# Patient Record
Sex: FEMALE | Race: WHITE | NOT HISPANIC OR LATINO
[De-identification: names, ages, dates, MRNs, and addresses within clinical notes are randomized per-mention and may not be internally consistent; named-entity substitution may affect disease eponyms.]

---

## 2017-06-20 ENCOUNTER — APPOINTMENT (OUTPATIENT)
Dept: INTERNAL MEDICINE | Facility: CLINIC | Age: 22
End: 2017-06-20

## 2017-06-20 VITALS — BODY MASS INDEX: 24.1 KG/M2 | WEIGHT: 136 LBS | HEIGHT: 63 IN

## 2017-06-20 VITALS — SYSTOLIC BLOOD PRESSURE: 110 MMHG | DIASTOLIC BLOOD PRESSURE: 70 MMHG

## 2017-06-20 DIAGNOSIS — D64.9 ANEMIA, UNSPECIFIED: ICD-10-CM

## 2017-06-20 DIAGNOSIS — R53.83 OTHER FATIGUE: ICD-10-CM

## 2017-06-20 LAB
BILIRUB UR QL STRIP: NORMAL
CLARITY UR: CLEAR
COLLECTION METHOD: NORMAL
GLUCOSE UR-MCNC: NORMAL
HCG UR QL: 0.2 EU/DL
HGB UR QL STRIP.AUTO: NORMAL
KETONES UR-MCNC: NORMAL
LEUKOCYTE ESTERASE UR QL STRIP: NORMAL
NITRITE UR QL STRIP: NORMAL
PH UR STRIP: 5.5
PROT UR STRIP-MCNC: NORMAL
SP GR UR STRIP: 1

## 2017-06-20 RX ORDER — FLUTICASONE PROPIONATE 0.5 MG/G
0.05 CREAM TOPICAL
Qty: 30 | Refills: 0 | Status: DISCONTINUED | COMMUNITY
Start: 2017-04-13

## 2017-06-20 RX ORDER — LURASIDONE HYDROCHLORIDE 20 MG/1
20 TABLET, FILM COATED ORAL
Qty: 30 | Refills: 0 | Status: DISCONTINUED | COMMUNITY
Start: 2016-12-20

## 2017-06-20 RX ORDER — ONDANSETRON 8 MG/1
8 TABLET, ORALLY DISINTEGRATING ORAL
Qty: 15 | Refills: 0 | Status: DISCONTINUED | COMMUNITY
Start: 2017-06-17

## 2017-06-20 RX ORDER — DIVALPROEX SODIUM 500 MG/1
500 TABLET, DELAYED RELEASE ORAL
Qty: 60 | Refills: 0 | Status: DISCONTINUED | COMMUNITY
Start: 2016-12-20

## 2017-06-20 RX ORDER — QUETIAPINE FUMARATE 25 MG/1
25 TABLET ORAL
Qty: 30 | Refills: 0 | Status: DISCONTINUED | COMMUNITY
Start: 2016-12-20

## 2017-06-21 LAB
25(OH)D3 SERPL-MCNC: 32.1 NG/ML
ALBUMIN SERPL ELPH-MCNC: 4.6 G/DL
ALP BLD-CCNC: 39 U/L
ALT SERPL-CCNC: 14 U/L
AMYLASE/CREAT SERPL: 66 U/L
ANION GAP SERPL CALC-SCNC: 13 MMOL/L
AST SERPL-CCNC: 26 U/L
BASOPHILS # BLD AUTO: 0.01 K/UL
BASOPHILS NFR BLD AUTO: 0.2 %
BILIRUB SERPL-MCNC: 0.3 MG/DL
BUN SERPL-MCNC: 17 MG/DL
CALCIUM SERPL-MCNC: 9.4 MG/DL
CHLORIDE SERPL-SCNC: 102 MMOL/L
CHOLEST SERPL-MCNC: 147 MG/DL
CHOLEST/HDLC SERPL: 2.9 RATIO
CO2 SERPL-SCNC: 23 MMOL/L
CREAT SERPL-MCNC: 0.94 MG/DL
EOSINOPHIL # BLD AUTO: 0.04 K/UL
EOSINOPHIL NFR BLD AUTO: 0.7 %
FERRITIN SERPL-MCNC: 44 NG/ML
GLUCOSE SERPL-MCNC: 94 MG/DL
HBA1C MFR BLD HPLC: 4.8 %
HCT VFR BLD CALC: 40.6 %
HDLC SERPL-MCNC: 51 MG/DL
HGB BLD-MCNC: 13.2 G/DL
IMM GRANULOCYTES NFR BLD AUTO: 0 %
LDLC SERPL CALC-MCNC: 64 MG/DL
LPL SERPL-CCNC: 24 U/L
LYMPHOCYTES # BLD AUTO: 1.89 K/UL
LYMPHOCYTES NFR BLD AUTO: 32 %
MAN DIFF?: NORMAL
MCHC RBC-ENTMCNC: 31.1 PG
MCHC RBC-ENTMCNC: 32.5 GM/DL
MCV RBC AUTO: 95.5 FL
MONOCYTES # BLD AUTO: 0.26 K/UL
MONOCYTES NFR BLD AUTO: 4.4 %
NEUTROPHILS # BLD AUTO: 3.71 K/UL
NEUTROPHILS NFR BLD AUTO: 62.7 %
PLATELET # BLD AUTO: 209 K/UL
POTASSIUM SERPL-SCNC: 4.7 MMOL/L
PROT SERPL-MCNC: 7.7 G/DL
RBC # BLD: 4.25 M/UL
RBC # FLD: 12.9 %
SAVE SPECIMEN: NORMAL
SODIUM SERPL-SCNC: 138 MMOL/L
T4 FREE SERPL-MCNC: 1.1 NG/DL
TRIGL SERPL-MCNC: 158 MG/DL
TSH SERPL-ACNC: 2.33 UIU/ML
URATE SERPL-MCNC: 4.4 MG/DL
WBC # FLD AUTO: 5.91 K/UL

## 2017-06-27 ENCOUNTER — APPOINTMENT (OUTPATIENT)
Dept: ULTRASOUND IMAGING | Facility: CLINIC | Age: 22
End: 2017-06-27

## 2018-02-01 ENCOUNTER — APPOINTMENT (OUTPATIENT)
Dept: INTERNAL MEDICINE | Facility: CLINIC | Age: 23
End: 2018-02-01

## 2018-08-25 ENCOUNTER — EMERGENCY (EMERGENCY)
Facility: HOSPITAL | Age: 23
LOS: 1 days | Discharge: HOME | End: 2018-08-25
Attending: EMERGENCY MEDICINE
Payer: COMMERCIAL

## 2018-08-25 VITALS
OXYGEN SATURATION: 97 % | SYSTOLIC BLOOD PRESSURE: 128 MMHG | RESPIRATION RATE: 16 BRPM | DIASTOLIC BLOOD PRESSURE: 83 MMHG | TEMPERATURE: 99 F | HEART RATE: 72 BPM

## 2018-08-25 VITALS
HEART RATE: 95 BPM | TEMPERATURE: 99 F | OXYGEN SATURATION: 98 % | DIASTOLIC BLOOD PRESSURE: 88 MMHG | SYSTOLIC BLOOD PRESSURE: 142 MMHG | RESPIRATION RATE: 16 BRPM | HEIGHT: 64 IN | WEIGHT: 149.91 LBS

## 2018-08-25 LAB
ALBUMIN SERPL ELPH-MCNC: 5 G/DL — SIGNIFICANT CHANGE UP (ref 3.3–5)
ALP SERPL-CCNC: 43 U/L — SIGNIFICANT CHANGE UP (ref 40–120)
ALT FLD-CCNC: 22 U/L — SIGNIFICANT CHANGE UP (ref 10–45)
ANION GAP SERPL CALC-SCNC: 15 MMOL/L — SIGNIFICANT CHANGE UP (ref 5–17)
APPEARANCE UR: CLEAR — SIGNIFICANT CHANGE UP
AST SERPL-CCNC: 28 U/L — SIGNIFICANT CHANGE UP (ref 10–40)
BACTERIA # UR AUTO: ABNORMAL /HPF
BILIRUB SERPL-MCNC: 0.3 MG/DL — SIGNIFICANT CHANGE UP (ref 0.2–1.2)
BILIRUB UR-MCNC: NEGATIVE — SIGNIFICANT CHANGE UP
BUN SERPL-MCNC: 10 MG/DL — SIGNIFICANT CHANGE UP (ref 7–23)
CALCIUM SERPL-MCNC: 9.5 MG/DL — SIGNIFICANT CHANGE UP (ref 8.4–10.5)
CHLORIDE SERPL-SCNC: 102 MMOL/L — SIGNIFICANT CHANGE UP (ref 96–108)
CO2 SERPL-SCNC: 21 MMOL/L — LOW (ref 22–31)
COLOR SPEC: SIGNIFICANT CHANGE UP
CREAT SERPL-MCNC: 0.79 MG/DL — SIGNIFICANT CHANGE UP (ref 0.5–1.3)
DIFF PNL FLD: ABNORMAL
EPI CELLS # UR: SIGNIFICANT CHANGE UP /HPF
GLUCOSE SERPL-MCNC: 99 MG/DL — SIGNIFICANT CHANGE UP (ref 70–99)
GLUCOSE UR QL: NEGATIVE — SIGNIFICANT CHANGE UP
HCG UR QL: NEGATIVE — SIGNIFICANT CHANGE UP
HCT VFR BLD CALC: 40.2 % — SIGNIFICANT CHANGE UP (ref 34.5–45)
HGB BLD-MCNC: 13.6 G/DL — SIGNIFICANT CHANGE UP (ref 11.5–15.5)
KETONES UR-MCNC: NEGATIVE — SIGNIFICANT CHANGE UP
LEUKOCYTE ESTERASE UR-ACNC: NEGATIVE — SIGNIFICANT CHANGE UP
MCHC RBC-ENTMCNC: 31.7 PG — SIGNIFICANT CHANGE UP (ref 27–34)
MCHC RBC-ENTMCNC: 33.8 GM/DL — SIGNIFICANT CHANGE UP (ref 32–36)
MCV RBC AUTO: 93.5 FL — SIGNIFICANT CHANGE UP (ref 80–100)
NITRITE UR-MCNC: NEGATIVE — SIGNIFICANT CHANGE UP
PH UR: 6.5 — SIGNIFICANT CHANGE UP (ref 5–8)
PLATELET # BLD AUTO: 224 K/UL — SIGNIFICANT CHANGE UP (ref 150–400)
POTASSIUM SERPL-MCNC: 3.8 MMOL/L — SIGNIFICANT CHANGE UP (ref 3.5–5.3)
POTASSIUM SERPL-SCNC: 3.8 MMOL/L — SIGNIFICANT CHANGE UP (ref 3.5–5.3)
PROT SERPL-MCNC: 7.5 G/DL — SIGNIFICANT CHANGE UP (ref 6–8.3)
PROT UR-MCNC: NEGATIVE — SIGNIFICANT CHANGE UP
RBC # BLD: 4.3 M/UL — SIGNIFICANT CHANGE UP (ref 3.8–5.2)
RBC # FLD: 12.1 % — SIGNIFICANT CHANGE UP (ref 10.3–14.5)
RBC CASTS # UR COMP ASSIST: SIGNIFICANT CHANGE UP /HPF (ref 0–2)
SODIUM SERPL-SCNC: 138 MMOL/L — SIGNIFICANT CHANGE UP (ref 135–145)
SP GR SPEC: 1.01 — LOW (ref 1.01–1.02)
UROBILINOGEN FLD QL: NEGATIVE — SIGNIFICANT CHANGE UP
WBC # BLD: 4.8 K/UL — SIGNIFICANT CHANGE UP (ref 3.8–10.5)
WBC # FLD AUTO: 4.8 K/UL — SIGNIFICANT CHANGE UP (ref 3.8–10.5)
WBC UR QL: SIGNIFICANT CHANGE UP /HPF (ref 0–5)

## 2018-08-25 PROCEDURE — 85027 COMPLETE CBC AUTOMATED: CPT

## 2018-08-25 PROCEDURE — 99284 EMERGENCY DEPT VISIT MOD MDM: CPT

## 2018-08-25 PROCEDURE — 74177 CT ABD & PELVIS W/CONTRAST: CPT

## 2018-08-25 PROCEDURE — 96374 THER/PROPH/DIAG INJ IV PUSH: CPT | Mod: XU

## 2018-08-25 PROCEDURE — 99284 EMERGENCY DEPT VISIT MOD MDM: CPT | Mod: 25

## 2018-08-25 PROCEDURE — 81025 URINE PREGNANCY TEST: CPT

## 2018-08-25 PROCEDURE — 80053 COMPREHEN METABOLIC PANEL: CPT

## 2018-08-25 PROCEDURE — 81001 URINALYSIS AUTO W/SCOPE: CPT

## 2018-08-25 PROCEDURE — 87591 N.GONORRHOEAE DNA AMP PROB: CPT

## 2018-08-25 PROCEDURE — 74177 CT ABD & PELVIS W/CONTRAST: CPT | Mod: 26

## 2018-08-25 RX ORDER — SODIUM CHLORIDE 9 MG/ML
1000 INJECTION INTRAMUSCULAR; INTRAVENOUS; SUBCUTANEOUS ONCE
Qty: 0 | Refills: 0 | Status: COMPLETED | OUTPATIENT
Start: 2018-08-25 | End: 2018-08-25

## 2018-08-25 RX ORDER — KETOROLAC TROMETHAMINE 30 MG/ML
30 SYRINGE (ML) INJECTION ONCE
Qty: 0 | Refills: 0 | Status: DISCONTINUED | OUTPATIENT
Start: 2018-08-25 | End: 2018-08-25

## 2018-08-25 RX ADMIN — Medication 30 MILLIGRAM(S): at 21:20

## 2018-08-25 RX ADMIN — SODIUM CHLORIDE 1000 MILLILITER(S): 9 INJECTION INTRAMUSCULAR; INTRAVENOUS; SUBCUTANEOUS at 17:37

## 2018-08-25 NOTE — ED ADULT NURSE NOTE - CHPI ED NUR SYMPTOMS NEG
no nausea/no vomiting/no chills/no abdominal distension/no burning urination/no blood in stool/no diarrhea/no dysuria/no fever

## 2018-08-25 NOTE — ED PROVIDER NOTE - DISPOSITION TYPE
I saw Daniel Given in the Done [facility] in [facility] today for strep pharyngitis,  he was treated with Zithromax. Daniel Given will follow up with you if no better or as needed.  Thank you for the opportunity to care for [facility] DORIAN GUSTAFSON DISCHARGE

## 2018-08-25 NOTE — ED ADULT NURSE REASSESSMENT NOTE - NS ED NURSE REASSESS COMMENT FT1
Patient returned from CT scan; A&Ox3 and denies abdominal pain, nausea and vomiting at this time. VSS, 20g IV in R forearm patent and site WNL. family at the bedside. Waiting for CT results.

## 2018-08-25 NOTE — ED ADULT NURSE NOTE - NSIMPLEMENTINTERV_GEN_ALL_ED
Implemented All Universal Safety Interventions:  [place] to call system. Call bell, personal items and telephone within reach. Instruct patient to call for assistance. Room bathroom lighting operational. Non-slip footwear when patient is off stretcher. Physically safe environment: no spills, clutter or unnecessary equipment. Stretcher in lowest position, wheels locked, appropriate side rails in place.

## 2018-08-25 NOTE — ED ADULT NURSE NOTE - OBJECTIVE STATEMENT
22 y/o female presents to the ED c/o RLQ pain x 4 days. Pt evaluated in urgent care and advised to come to ED for r/o appendicitis. Pt also endorsing hematuria, and some bloody stool with wiping. Denies n/v/d/c, fever, chills, urinary s/s, vaginal bleeding/discharge. Pt A&Ox3, in no respiratory distress, no CP, abdomen soft, tender to palpitation in RLQ, nondistended. PT safety maintained with family at bedside.

## 2018-08-25 NOTE — ED PROCEDURE NOTE - PROCEDURE ADDITIONAL DETAILS
POCUS: Emergency Department Focused Ultrasound performed at patient's bedside.  Educational ultrasound was performed, abdominopelvic CT scan to follow.

## 2018-08-25 NOTE — ED PROVIDER NOTE - MEDICAL DECISION MAKING DETAILS
24 y/o female presenting to ED with RLQ pain, positive rebound and psoas sign. DDx: appendicitis. Will do labs, CT scan and reassess.  ATTG: Dr. Garibay

## 2018-08-25 NOTE — ED PROVIDER NOTE - OBJECTIVE STATEMENT
22 y/o female presenting with RLQ pain since 4 days ago and is experienced nausea. Denies any vomiting. Recently went to an urgent care for similar symptoms and was told to come to the ED for further evaluation as they believed she had appendicitis.

## 2018-08-25 NOTE — ED ADULT NURSE REASSESSMENT NOTE - NS ED NURSE REASSESS COMMENT FT1
Patient requesting pain medications, pain 6/10 in R upper abdomen; Dr Garibay aware and at patients bedside.

## 2018-08-27 LAB
N GONORRHOEA RRNA SPEC QL NAA+PROBE: SIGNIFICANT CHANGE UP
SPECIMEN SOURCE: SIGNIFICANT CHANGE UP

## 2018-08-28 NOTE — ED POST DISCHARGE NOTE - DETAILS
lvm to call back. patients gc negative however it appears Chlamydia was not sent. will call to make patient aware and recommend follow up for retest - Charity Funk PA-C discussed with patient and she states no concern for STD at this time, already follow up with pMD - Charity Funk PA-C

## 2018-11-01 ENCOUNTER — APPOINTMENT (OUTPATIENT)
Dept: INTERNAL MEDICINE | Facility: CLINIC | Age: 23
End: 2018-11-01

## 2018-12-14 ENCOUNTER — LABORATORY RESULT (OUTPATIENT)
Age: 23
End: 2018-12-14

## 2018-12-14 ENCOUNTER — APPOINTMENT (OUTPATIENT)
Dept: INTERNAL MEDICINE | Facility: CLINIC | Age: 23
End: 2018-12-14
Payer: COMMERCIAL

## 2018-12-14 ENCOUNTER — NON-APPOINTMENT (OUTPATIENT)
Age: 23
End: 2018-12-14

## 2018-12-14 VITALS — SYSTOLIC BLOOD PRESSURE: 110 MMHG | DIASTOLIC BLOOD PRESSURE: 70 MMHG

## 2018-12-14 VITALS — WEIGHT: 159 LBS | BODY MASS INDEX: 28.17 KG/M2 | HEIGHT: 63 IN

## 2018-12-14 DIAGNOSIS — R51 HEADACHE: ICD-10-CM

## 2018-12-14 DIAGNOSIS — F41.9 ANXIETY DISORDER, UNSPECIFIED: ICD-10-CM

## 2018-12-14 LAB
BILIRUB UR QL STRIP: NORMAL
CLARITY UR: CLEAR
COLLECTION METHOD: NORMAL
GLUCOSE UR-MCNC: NORMAL
HCG UR QL: 0.2 EU/DL
HGB UR QL STRIP.AUTO: NORMAL
KETONES UR-MCNC: NORMAL
LEUKOCYTE ESTERASE UR QL STRIP: NORMAL
NITRITE UR QL STRIP: NORMAL
PH UR STRIP: 5.5
PROT UR STRIP-MCNC: NORMAL
SP GR UR STRIP: 1.02

## 2018-12-14 PROCEDURE — 81003 URINALYSIS AUTO W/O SCOPE: CPT | Mod: QW

## 2018-12-14 PROCEDURE — 90686 IIV4 VACC NO PRSV 0.5 ML IM: CPT

## 2018-12-14 PROCEDURE — 36415 COLL VENOUS BLD VENIPUNCTURE: CPT

## 2018-12-14 PROCEDURE — 93000 ELECTROCARDIOGRAM COMPLETE: CPT

## 2018-12-14 PROCEDURE — 99395 PREV VISIT EST AGE 18-39: CPT | Mod: 25

## 2018-12-14 PROCEDURE — G0008: CPT

## 2018-12-14 RX ORDER — DOXYCYCLINE 50 MG/1
50 CAPSULE ORAL
Qty: 60 | Refills: 0 | Status: DISCONTINUED | COMMUNITY
Start: 2017-04-13 | End: 2018-12-14

## 2018-12-14 RX ORDER — NORETHINDRONE ACETATE AND ETHINYL ESTRADIOL, ETHINYL ESTRADIOL AND FERROUS FUMARATE 1MG-10(24)
1 MG-10 MCG / KIT ORAL
Qty: 84 | Refills: 0 | Status: DISCONTINUED | COMMUNITY
Start: 2016-09-12 | End: 2018-12-14

## 2018-12-14 RX ORDER — ADAPALENE 1 MG/G
0.1 CREAM TOPICAL
Qty: 45 | Refills: 0 | Status: DISCONTINUED | COMMUNITY
Start: 2016-09-08 | End: 2018-12-14

## 2018-12-14 RX ORDER — QUETIAPINE 150 MG/1
150 TABLET, FILM COATED, EXTENDED RELEASE ORAL
Qty: 30 | Refills: 0 | Status: DISCONTINUED | COMMUNITY
Start: 2017-02-23 | End: 2018-12-14

## 2018-12-14 RX ORDER — GABAPENTIN 400 MG/1
400 CAPSULE ORAL
Qty: 150 | Refills: 0 | Status: DISCONTINUED | COMMUNITY
Start: 2017-05-03 | End: 2018-12-14

## 2018-12-14 RX ORDER — LEVONORGESTREL AND ETHINYL ESTRADIOL 0.1-0.02MG
0.1-2 KIT ORAL
Qty: 84 | Refills: 0 | Status: DISCONTINUED | COMMUNITY
Start: 2017-03-31 | End: 2018-12-14

## 2018-12-14 RX ORDER — DAPSONE 50 MG/G
5 GEL TOPICAL
Qty: 60 | Refills: 0 | Status: DISCONTINUED | COMMUNITY
Start: 2017-01-31 | End: 2018-12-14

## 2018-12-14 NOTE — ASSESSMENT
[FreeTextEntry1] : On physical examination the patient's blood pressure was normal. Her physical examination is positive for being overweight and multiple tattoos. I referred her to neurology and also referred her for sleep apnea study and her thoughts are loose weight

## 2018-12-14 NOTE — HISTORY OF PRESENT ILLNESS
[de-identified] : Visit 23-year-old patient with a history of ADD and anxiety. She is presently being treated with a host of medications by her psychiatrist. She is here today for her annual examination. Her main complaint is snoring at night and recurrent bilateral headaches. She denies any neurological deficit associated with them she is up-to-date with her GYN examination

## 2018-12-14 NOTE — HEALTH RISK ASSESSMENT
[] : No [No falls in past year] : Patient reported no falls in the past year [0] : 2) Feeling down, depressed, or hopeless: Not at all (0) [[id number]] : 0 [Fully functional (bathing, dressing, toileting, transferring, walking, feeding)] : Fully functional (bathing, dressing, toileting, transferring, walking, feeding) [Fully functional (using the telephone, shopping, preparing meals, housekeeping, doing laundry, using] : Fully functional and needs no help or supervision to perform IADLs (using the telephone, shopping, preparing meals, housekeeping, doing laundry, using transportation, managing medications and managing finances) [Patient declined discussion] : Patient declined discussion

## 2018-12-14 NOTE — PHYSICAL EXAM
[Normal Sclera/Conjunctiva] : normal sclera/conjunctiva [EOMI] : extraocular movements intact [Normal Outer Ear/Nose] : the outer ears and nose were normal in appearance [Normal Oropharynx] : the oropharynx was normal [Normal TMs] : both tympanic membranes were normal [No JVD] : no jugular venous distention [Supple] : supple [No Lymphadenopathy] : no lymphadenopathy [No Respiratory Distress] : no respiratory distress  [Clear to Auscultation] : lungs were clear to auscultation bilaterally [No Accessory Muscle Use] : no accessory muscle use [Normal Rate] : normal rate  [Regular Rhythm] : with a regular rhythm [Normal S1, S2] : normal S1 and S2 [Soft] : abdomen soft [Non Tender] : non-tender [Non-distended] : non-distended [No HSM] : no HSM [Normal Bowel Sounds] : normal bowel sounds [No Hernias] : no hernias [No Joint Swelling] : no joint swelling [Grossly Normal Strength/Tone] : grossly normal strength/tone [No Rash] : no rash [No Skin Lesions] : no skin lesions [Normal Gait] : normal gait [Coordination Grossly Intact] : coordination grossly intact [Normal Affect] : the affect was normal [de-identified] : [place]

## 2018-12-16 ENCOUNTER — CLINICAL ADVICE (OUTPATIENT)
Age: 23
End: 2018-12-16

## 2018-12-16 LAB
25(OH)D3 SERPL-MCNC: 19.6 NG/ML
ALBUMIN SERPL ELPH-MCNC: 4.6 G/DL
ALP BLD-CCNC: 41 U/L
ALT SERPL-CCNC: 21 U/L
ANION GAP SERPL CALC-SCNC: 11 MMOL/L
AST SERPL-CCNC: 30 U/L
BASOPHILS # BLD AUTO: 0.01 K/UL
BASOPHILS NFR BLD AUTO: 0.2 %
BILIRUB SERPL-MCNC: 0.2 MG/DL
BUN SERPL-MCNC: 17 MG/DL
CALCIUM SERPL-MCNC: 9.3 MG/DL
CHLORIDE SERPL-SCNC: 105 MMOL/L
CHOLEST SERPL-MCNC: 167 MG/DL
CHOLEST/HDLC SERPL: 3.6 RATIO
CO2 SERPL-SCNC: 22 MMOL/L
CREAT SERPL-MCNC: 0.77 MG/DL
EOSINOPHIL # BLD AUTO: 0.07 K/UL
EOSINOPHIL NFR BLD AUTO: 1.1 %
FERRITIN SERPL-MCNC: 50 NG/ML
GLUCOSE SERPL-MCNC: 109 MG/DL
HBA1C MFR BLD HPLC: 4.8 %
HBV SURFACE AB SER QL: NONREACTIVE
HBV SURFACE AG SER QL: NONREACTIVE
HCT VFR BLD CALC: 37.6 %
HDLC SERPL-MCNC: 46 MG/DL
HGB BLD-MCNC: 12.1 G/DL
IMM GRANULOCYTES NFR BLD AUTO: 0.3 %
LDLC SERPL CALC-MCNC: 60 MG/DL
LYMPHOCYTES # BLD AUTO: 2.07 K/UL
LYMPHOCYTES NFR BLD AUTO: 32.4 %
MAN DIFF?: NORMAL
MCHC RBC-ENTMCNC: 30.6 PG
MCHC RBC-ENTMCNC: 32.2 GM/DL
MCV RBC AUTO: 94.9 FL
MONOCYTES # BLD AUTO: 0.3 K/UL
MONOCYTES NFR BLD AUTO: 4.7 %
NEUTROPHILS # BLD AUTO: 3.92 K/UL
NEUTROPHILS NFR BLD AUTO: 61.3 %
PLATELET # BLD AUTO: 246 K/UL
POTASSIUM SERPL-SCNC: 4.3 MMOL/L
PROT SERPL-MCNC: 6.6 G/DL
RBC # BLD: 3.96 M/UL
RBC # FLD: 13.4 %
SAVE SPECIMEN: NORMAL
SODIUM SERPL-SCNC: 138 MMOL/L
T3RU NFR SERPL: 1.08 INDEX
T4 SERPL-MCNC: 7.9 UG/DL
TRIGL SERPL-MCNC: 303 MG/DL
TSH SERPL-ACNC: 1.07 UIU/ML
URATE SERPL-MCNC: 4.7 MG/DL
VIT B12 SERPL-MCNC: 498 PG/ML
WBC # FLD AUTO: 6.39 K/UL

## 2019-03-04 ENCOUNTER — APPOINTMENT (OUTPATIENT)
Dept: PULMONOLOGY | Facility: CLINIC | Age: 24
End: 2019-03-04
Payer: COMMERCIAL

## 2019-03-04 VITALS
TEMPERATURE: 97.7 F | DIASTOLIC BLOOD PRESSURE: 76 MMHG | HEART RATE: 81 BPM | HEIGHT: 63 IN | WEIGHT: 166 LBS | SYSTOLIC BLOOD PRESSURE: 121 MMHG | OXYGEN SATURATION: 95 % | RESPIRATION RATE: 15 BRPM | BODY MASS INDEX: 29.41 KG/M2

## 2019-03-04 DIAGNOSIS — F51.4 SLEEP TERRORS [NIGHT TERRORS]: ICD-10-CM

## 2019-03-04 DIAGNOSIS — G47.52 REM SLEEP BEHAVIOR DISORDER: ICD-10-CM

## 2019-03-04 DIAGNOSIS — G47.50 PARASOMNIA, UNSPECIFIED: ICD-10-CM

## 2019-03-04 PROCEDURE — 99204 OFFICE O/P NEW MOD 45 MIN: CPT | Mod: GC

## 2019-04-02 ENCOUNTER — OUTPATIENT (OUTPATIENT)
Dept: OUTPATIENT SERVICES | Facility: HOSPITAL | Age: 24
LOS: 1 days | End: 2019-04-02
Payer: COMMERCIAL

## 2019-04-02 ENCOUNTER — APPOINTMENT (OUTPATIENT)
Dept: SLEEP CENTER | Facility: CLINIC | Age: 24
End: 2019-04-02
Payer: COMMERCIAL

## 2019-04-02 PROCEDURE — 95810 POLYSOM 6/> YRS 4/> PARAM: CPT

## 2019-04-02 PROCEDURE — 95810 POLYSOM 6/> YRS 4/> PARAM: CPT | Mod: 26

## 2019-04-03 DIAGNOSIS — G47.33 OBSTRUCTIVE SLEEP APNEA (ADULT) (PEDIATRIC): ICD-10-CM

## 2019-04-29 ENCOUNTER — APPOINTMENT (OUTPATIENT)
Dept: PULMONOLOGY | Facility: CLINIC | Age: 24
End: 2019-04-29
Payer: COMMERCIAL

## 2019-04-29 VITALS
OXYGEN SATURATION: 98 % | DIASTOLIC BLOOD PRESSURE: 81 MMHG | BODY MASS INDEX: 29.07 KG/M2 | TEMPERATURE: 97.7 F | SYSTOLIC BLOOD PRESSURE: 119 MMHG | WEIGHT: 164.13 LBS | HEART RATE: 80 BPM | RESPIRATION RATE: 16 BRPM

## 2019-04-29 DIAGNOSIS — F51.3 SLEEPWALKING [SOMNAMBULISM]: ICD-10-CM

## 2019-04-29 DIAGNOSIS — G47.8 OTHER SLEEP DISORDERS: ICD-10-CM

## 2019-04-29 PROCEDURE — 99215 OFFICE O/P EST HI 40 MIN: CPT | Mod: GC

## 2019-04-29 NOTE — HISTORY OF PRESENT ILLNESS
[FreeTextEntry1] : Ms. Avalos is a 23 year old female with a significant pmhx of bipolar I disorder who comes in for a follow up for insomnia and abnormal sleep behavior.\par \par She had a sleep study on 4/2/19, AHI 0.5/hr. It also showed abrupt spontaneous arousals associated with arm and leg movements during N3 sleep, suggestive of nonREM parasomnias.\par \par She is still endorsing difficulty initiating and maintaining sleep. Reports going to bed around 11pm, stays in bed usually to 11am, up to as late as 2pm. Wakes up frequently throughout the night. Parasomnias are still present. Reports falling down the stairs yesterday night as part of a somnambulism episode. She was not aware until this morning when she felt her wrist hurting in the shower. she also reports nearly nightly episodes of sleep talking and arm and leg movements.  she also reports nocturnal sleep eating-- she repots being unaware that she has eaten during the night and typically finds food in the bedroom with nor recollection as to how it got there.   her mother denies parasomnia like behavior when she was a child and denies a family history of parasomnias. she reports episodes occur nearly on a nightly basis. no reported dream enactment behavior. \par \par she repots that she is at times disturbed during sleep by her dog who sleeps in her bed with her.  she denies n oise or other disturbances in her sleep environment.  she denies caffeine consumption during the day/evening except for one cup of coffee in the morning. \par \par she is on several psychotropic medications for bipolar disorder.  she states that these nocturnal behaviors occurred before she was on these medications. \par \par while a brief  period of lagophthalmus was noted on the video recording of the psg, she denies awakeing with dry eyes or other eye related symptoms.

## 2019-04-29 NOTE — PHYSICAL EXAM
[Normal Appearance] : normal appearance [General Appearance - In No Acute Distress] : no acute distress [Normal Conjunctiva] : the conjunctiva exhibited no abnormalities [I] : I [Heart Rate And Rhythm] : heart rate was normal and rhythm regular [Heart Sounds] : normal S1 and S2 [] : no respiratory distress [Respiration, Rhythm And Depth] : normal respiratory rhythm and effort [Auscultation Breath Sounds / Voice Sounds] : lungs were clear to auscultation bilaterally [Bowel Sounds] : normal bowel sounds [Abdomen Soft] : soft [Involuntary Movements] : no involuntary movements were seen [Cyanosis, Localized] : no localized cyanosis [Skin Color & Pigmentation] : normal skin color and pigmentation [No Focal Deficits] : no focal deficits [Affect] : the affect was normal [Mood] : the mood was normal

## 2019-04-29 NOTE — ASSESSMENT
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 Avalos is a 23 year old female with a significant pmhx of bipolar I disorder who comes in for a follow up for insomnia and unusual sleep behavior. She had a sleep study on 4/2/19 which did not show sleep disordered breathing, but was significant for spontaneous arousals associated with arm and leg movements during N3 sleep, suggestive of nonREM parasomnias. she reports events nearly on a nightly basis and she has injured herself falling down the stairs during a somnambulism event last night. she also reports nocturnal sleep eating behaviors.  these reports as well as the psg findings are consistent with NREM parasomnias.  t is still likely that her bipolar disorder is playing a role in both preventing her to initiate and maintain sleep; at this point, she has sustained an injury from parasomnia behavior.  she was advised to secure  her bedroom environment removing objects that could cause injury.  in addition she was advised to place a simply lock on her bedroom door to prevent her from leaving the bedroom and going to the stairs during another parasomnia event.  in addition, she was advised to eliminate any disturbing factors in her bedroom that could cause arousals from N# sleep. in this regard she sleeps with her pet dog which at times disturbs her sleep.  she was advised to remove the dog from the bed to see if this reduces the frequency of parasomnia events. if these measures re not effective, CBT for insomnia and to reduced sleep related anxiety might be effective in reducing arousals from n3  sleep and thus n3 parasomnias. she was referred to Dr. Heaven Edgar in this regard.  The patient was advised to develop a regular sleep schedule, eliminate evening caffeine, and remove external stimuli that may be disrupting her sleep -  If her parasomnias remain unchanged with behavior modification and CBT, will need to discuss with her psychiatrist about initiating low dose clonazepam before bedtime which can control parasomnias by reducing arousals during N3 sleep. during the study a brief period of lagophthalmus was noted. however, the pt denies eye related symptoms, in particular dry eyes, so no further intervention in this regard is needed.

## 2019-04-29 NOTE — REVIEW OF SYSTEMS
[Fatigue] : fatigue [Recent Wt Gain (___ Lbs)] : recent [unfilled] ~Ulb weight gain [A.M. Headache] : headache present upon awakening [Difficulty Initiating Sleep] : difficulty falling asleep [Difficulty Maintaining Sleep] : difficulty maintaining sleep [Chronic Insomnia] : chronic  insomnia [Unusual Sleep Behavior] : unusual sleep behavior [Witnessed Apneas] : no witnessed apnea [Shortness Of Breath] : no shortness of breath [Chest Pain] : no chest pain [Thyroid Disease] : no thyroid disease [Diabetes] : no diabetes  [Anemia] : no anemia [Cataplexy] :  no cataplexy [Heartburn] : no heartburn [Nocturia] : no nocturia [Arthralgias] : no arthralgias [Lower Extremity Discomfort] : no lower extremity discomfort [Hypersomnolence] : not sleeping much more than usual [de-identified] : h/o bipolar disorder as per hpi [de-identified] : see hpi

## 2019-06-05 ENCOUNTER — APPOINTMENT (OUTPATIENT)
Dept: INTERNAL MEDICINE | Facility: CLINIC | Age: 24
End: 2019-06-05

## 2019-10-08 ENCOUNTER — APPOINTMENT (OUTPATIENT)
Dept: INTERNAL MEDICINE | Facility: CLINIC | Age: 24
End: 2019-10-08
Payer: COMMERCIAL

## 2019-10-08 VITALS
HEIGHT: 63 IN | WEIGHT: 144 LBS | HEART RATE: 94 BPM | SYSTOLIC BLOOD PRESSURE: 132 MMHG | DIASTOLIC BLOOD PRESSURE: 84 MMHG | BODY MASS INDEX: 25.52 KG/M2

## 2019-10-08 VITALS — TEMPERATURE: 98.2 F

## 2019-10-08 PROCEDURE — 99213 OFFICE O/P EST LOW 20 MIN: CPT

## 2019-10-08 RX ORDER — LURASIDONE HYDROCHLORIDE 40 MG/1
40 TABLET, FILM COATED ORAL
Refills: 0 | Status: DISCONTINUED | COMMUNITY
Start: 2017-04-26 | End: 2019-10-08

## 2019-10-08 RX ORDER — SULFACETAMIDE SODIUM, SULFUR 100; 50 MG/G; MG/G
10-5 LIQUID TOPICAL
Qty: 170 | Refills: 0 | Status: DISCONTINUED | COMMUNITY
Start: 2016-10-05 | End: 2019-10-08

## 2019-10-08 RX ORDER — DOXEPIN HYDROCHLORIDE 25 MG/1
25 CAPSULE ORAL DAILY
Refills: 0 | Status: DISCONTINUED | COMMUNITY
Start: 2017-04-14 | End: 2019-10-08

## 2019-10-08 NOTE — PLAN
[FreeTextEntry1] : -Increase fluids, such as pedialyte, gatorade and ginger ale,  rest.\par -Risks and benefits of antibiotics explained in detail, Sometimes the antibiotics that you are taking to kill the disease causing bacteria also kill some of the normal bacteria in your intestinal tract (and/or in your vagina, if female). Buying and taking a "probiotic" may be helpful in replacing some of these normal bacteria.\par -Follow-up as needed if symptoms not improved in  3-5 days, sooner if worsens.\par

## 2019-10-08 NOTE — HISTORY OF PRESENT ILLNESS
[FreeTextEntry8] : 25 y/o female presents c/o sinus pressure/headache for the last 2-3 weeks.  associated with nausea, fatigue, and cough.  works in [employer] .  +recent travel, [place]  earlier this month.  normal po intake.

## 2019-10-08 NOTE — PHYSICAL EXAM
[Supple] : supple [No Lymphadenopathy] : no lymphadenopathy [Normal] : soft, non-tender, non-distended, no masses palpated, no HSM and normal bowel sounds [No Rash] : no rash [Normal Affect] : the affect was normal [FreeTextEntry1] : Ears: TM's normal bilaterally, middle ear effusion appreciated bilaterally\par Nose: nasal mucosa edematous, clear rhinorrhea, moderate airway obstruction\par Nose: nares erythematous, congested\par + tenderness to percussion over frontal sinus\par Pharynx non-erythematous, no exudates\par \par  [Normal Mood] : the mood was normal

## 2019-12-18 ENCOUNTER — APPOINTMENT (OUTPATIENT)
Dept: INTERNAL MEDICINE | Facility: CLINIC | Age: 24
End: 2019-12-18
Payer: COMMERCIAL

## 2019-12-18 VITALS
OXYGEN SATURATION: 97 % | TEMPERATURE: 98.3 F | HEIGHT: 63 IN | HEART RATE: 96 BPM | BODY MASS INDEX: 24.63 KG/M2 | DIASTOLIC BLOOD PRESSURE: 82 MMHG | WEIGHT: 139 LBS | SYSTOLIC BLOOD PRESSURE: 134 MMHG

## 2019-12-18 DIAGNOSIS — R05 COUGH: ICD-10-CM

## 2019-12-18 PROCEDURE — 99213 OFFICE O/P EST LOW 20 MIN: CPT

## 2019-12-18 RX ORDER — AMOXICILLIN 875 MG/1
875 TABLET, FILM COATED ORAL
Qty: 20 | Refills: 0 | Status: DISCONTINUED | COMMUNITY
Start: 2019-10-08 | End: 2019-12-18

## 2019-12-18 RX ORDER — BUPROPION HYDROCHLORIDE 300 MG/1
300 TABLET, EXTENDED RELEASE ORAL
Qty: 30 | Refills: 0 | Status: DISCONTINUED | COMMUNITY
Start: 2017-04-18 | End: 2019-12-18

## 2019-12-18 NOTE — PHYSICAL EXAM
[No Lymphadenopathy] : no lymphadenopathy [Supple] : supple [Normal] : normal rate, regular rhythm, normal S1 and S2 and no murmur heard [No Edema] : there was no peripheral edema [Soft] : abdomen soft [Normal Posterior Cervical Nodes] : no posterior cervical lymphadenopathy [Non-distended] : non-distended [Non Tender] : non-tender [No Rash] : no rash [Normal Anterior Cervical Nodes] : no anterior cervical lymphadenopathy [de-identified] : mild wheezing on left lung field

## 2019-12-18 NOTE — HISTORY OF PRESENT ILLNESS
[FreeTextEntry8] : 25 y/o female presents c/o cough, wheezing for the last 2-3 days.  sister was dxed with pneumonia at home.  associated with nausea, fatigue.  works in [employer] . normal po intake.  has been taking otc meds with no relief

## 2020-04-20 RX ORDER — AZITHROMYCIN 250 MG/1
250 TABLET, FILM COATED ORAL
Qty: 1 | Refills: 0 | Status: DISCONTINUED | COMMUNITY
Start: 2019-12-18 | End: 2020-04-20

## 2020-04-22 ENCOUNTER — RESULT REVIEW (OUTPATIENT)
Age: 25
End: 2020-04-22

## 2020-04-22 ENCOUNTER — OUTPATIENT (OUTPATIENT)
Dept: OUTPATIENT SERVICES | Facility: HOSPITAL | Age: 25
LOS: 1 days | End: 2020-04-22
Payer: COMMERCIAL

## 2020-04-22 ENCOUNTER — LABORATORY RESULT (OUTPATIENT)
Age: 25
End: 2020-04-22

## 2020-04-22 ENCOUNTER — APPOINTMENT (OUTPATIENT)
Dept: ULTRASOUND IMAGING | Facility: CLINIC | Age: 25
End: 2020-04-22
Payer: COMMERCIAL

## 2020-04-22 DIAGNOSIS — N64.3 GALACTORRHEA NOT ASSOCIATED WITH CHILDBIRTH: ICD-10-CM

## 2020-04-22 PROCEDURE — 76641 ULTRASOUND BREAST COMPLETE: CPT

## 2020-04-22 PROCEDURE — 76641 ULTRASOUND BREAST COMPLETE: CPT | Mod: 26,50

## 2020-04-23 DIAGNOSIS — E34.9 ENDOCRINE DISORDER, UNSPECIFIED: ICD-10-CM

## 2020-04-23 LAB
ALBUMIN SERPL ELPH-MCNC: 5.1 G/DL
ALP BLD-CCNC: 35 U/L
ALT SERPL-CCNC: 13 U/L
ANION GAP SERPL CALC-SCNC: 14 MMOL/L
AST SERPL-CCNC: 14 U/L
BASOPHILS # BLD AUTO: 0.03 K/UL
BASOPHILS NFR BLD AUTO: 0.4 %
BILIRUB SERPL-MCNC: 0.5 MG/DL
BUN SERPL-MCNC: 13 MG/DL
CALCIUM SERPL-MCNC: 10.1 MG/DL
CHLORIDE SERPL-SCNC: 100 MMOL/L
CO2 SERPL-SCNC: 23 MMOL/L
CREAT SERPL-MCNC: 0.78 MG/DL
EOSINOPHIL # BLD AUTO: 0.04 K/UL
EOSINOPHIL NFR BLD AUTO: 0.5 %
GLUCOSE SERPL-MCNC: 93 MG/DL
HCT VFR BLD CALC: 40.2 %
HGB BLD-MCNC: 12.7 G/DL
IMM GRANULOCYTES NFR BLD AUTO: 0.4 %
LH SERPL-ACNC: 0.7 IU/L
LYMPHOCYTES # BLD AUTO: 2.03 K/UL
LYMPHOCYTES NFR BLD AUTO: 26.5 %
MAN DIFF?: NORMAL
MCHC RBC-ENTMCNC: 30.9 PG
MCHC RBC-ENTMCNC: 31.6 GM/DL
MCV RBC AUTO: 97.8 FL
MONOCYTES # BLD AUTO: 0.55 K/UL
MONOCYTES NFR BLD AUTO: 7.2 %
NEUTROPHILS # BLD AUTO: 4.98 K/UL
NEUTROPHILS NFR BLD AUTO: 65 %
PLATELET # BLD AUTO: 196 K/UL
POTASSIUM SERPL-SCNC: 4.5 MMOL/L
PROLACTIN SERPL-MCNC: 23.9 NG/ML
PROT SERPL-MCNC: 7 G/DL
RBC # BLD: 4.11 M/UL
RBC # FLD: 13.3 %
SODIUM SERPL-SCNC: 137 MMOL/L
TSH SERPL-ACNC: 1.18 UIU/ML
WBC # FLD AUTO: 7.66 K/UL

## 2020-04-24 LAB — ESTROGEN SERPL-MCNC: 79 PG/ML

## 2020-04-25 ENCOUNTER — RESULT REVIEW (OUTPATIENT)
Age: 25
End: 2020-04-25

## 2020-04-25 ENCOUNTER — OUTPATIENT (OUTPATIENT)
Dept: OUTPATIENT SERVICES | Facility: HOSPITAL | Age: 25
LOS: 1 days | End: 2020-04-25
Payer: COMMERCIAL

## 2020-04-25 ENCOUNTER — APPOINTMENT (OUTPATIENT)
Dept: ULTRASOUND IMAGING | Facility: IMAGING CENTER | Age: 25
End: 2020-04-25
Payer: COMMERCIAL

## 2020-04-25 DIAGNOSIS — E34.9 ENDOCRINE DISORDER, UNSPECIFIED: ICD-10-CM

## 2020-04-25 PROCEDURE — 76830 TRANSVAGINAL US NON-OB: CPT

## 2020-04-25 PROCEDURE — 76830 TRANSVAGINAL US NON-OB: CPT | Mod: 26

## 2020-04-25 PROCEDURE — 76856 US EXAM PELVIC COMPLETE: CPT

## 2020-04-25 PROCEDURE — 76856 US EXAM PELVIC COMPLETE: CPT | Mod: 26,59

## 2020-04-26 LAB — HCG SERPL-MCNC: <1 MIU/ML

## 2020-04-28 ENCOUNTER — TRANSCRIPTION ENCOUNTER (OUTPATIENT)
Age: 25
End: 2020-04-28

## 2020-04-28 LAB — FSH: 2.1 MIU/ML

## 2020-05-01 ENCOUNTER — OUTPATIENT (OUTPATIENT)
Dept: OUTPATIENT SERVICES | Facility: HOSPITAL | Age: 25
LOS: 1 days | End: 2020-05-01
Payer: COMMERCIAL

## 2020-05-01 ENCOUNTER — APPOINTMENT (OUTPATIENT)
Dept: MRI IMAGING | Facility: CLINIC | Age: 25
End: 2020-05-01
Payer: COMMERCIAL

## 2020-05-01 DIAGNOSIS — N64.3 GALACTORRHEA NOT ASSOCIATED WITH CHILDBIRTH: ICD-10-CM

## 2020-05-01 PROCEDURE — 70553 MRI BRAIN STEM W/O & W/DYE: CPT | Mod: 26

## 2020-05-01 PROCEDURE — 70553 MRI BRAIN STEM W/O & W/DYE: CPT

## 2020-05-01 PROCEDURE — A9585: CPT

## 2020-07-28 ENCOUNTER — APPOINTMENT (OUTPATIENT)
Dept: INTERNAL MEDICINE | Facility: CLINIC | Age: 25
End: 2020-07-28
Payer: COMMERCIAL

## 2020-07-28 ENCOUNTER — LABORATORY RESULT (OUTPATIENT)
Age: 25
End: 2020-07-28

## 2020-07-28 ENCOUNTER — NON-APPOINTMENT (OUTPATIENT)
Age: 25
End: 2020-07-28

## 2020-07-28 VITALS
BODY MASS INDEX: 24.1 KG/M2 | TEMPERATURE: 98.2 F | WEIGHT: 136 LBS | HEIGHT: 63 IN | OXYGEN SATURATION: 99 % | HEART RATE: 112 BPM

## 2020-07-28 VITALS — SYSTOLIC BLOOD PRESSURE: 120 MMHG | DIASTOLIC BLOOD PRESSURE: 70 MMHG

## 2020-07-28 DIAGNOSIS — J01.10 ACUTE FRONTAL SINUSITIS, UNSPECIFIED: ICD-10-CM

## 2020-07-28 DIAGNOSIS — R10.9 UNSPECIFIED ABDOMINAL PAIN: ICD-10-CM

## 2020-07-28 DIAGNOSIS — Z00.00 ENCOUNTER FOR GENERAL ADULT MEDICAL EXAMINATION W/OUT ABNORMAL FINDINGS: ICD-10-CM

## 2020-07-28 DIAGNOSIS — F98.8 OTHER SPECIFIED BEHAVIORAL AND EMOTIONAL DISORDERS WITH ONSET USUALLY OCCURRING IN CHILDHOOD AND ADOLESCENCE: ICD-10-CM

## 2020-07-28 DIAGNOSIS — N64.3 GALACTORRHEA NOT ASSOCIATED WITH CHILDBIRTH: ICD-10-CM

## 2020-07-28 DIAGNOSIS — G47.51 CONFUSIONAL AROUSALS: ICD-10-CM

## 2020-07-28 DIAGNOSIS — Z88.9 ALLERGY STATUS TO UNSPECIFIED DRUGS, MEDICAMENTS AND BIOLOGICAL SUBSTANCES: ICD-10-CM

## 2020-07-28 LAB
ALBUMIN SERPL ELPH-MCNC: 5.3 G/DL
ALP BLD-CCNC: 39 U/L
ALT SERPL-CCNC: 13 U/L
ANION GAP SERPL CALC-SCNC: 13 MMOL/L
AST SERPL-CCNC: 20 U/L
BASOPHILS # BLD AUTO: 0.03 K/UL
BASOPHILS NFR BLD AUTO: 0.4 %
BILIRUB SERPL-MCNC: 0.4 MG/DL
BUN SERPL-MCNC: 14 MG/DL
CALCIUM SERPL-MCNC: 10 MG/DL
CHLORIDE SERPL-SCNC: 102 MMOL/L
CHOLEST SERPL-MCNC: 175 MG/DL
CHOLEST/HDLC SERPL: 3.2 RATIO
CO2 SERPL-SCNC: 24 MMOL/L
CREAT SERPL-MCNC: 0.82 MG/DL
EOSINOPHIL # BLD AUTO: 0.03 K/UL
EOSINOPHIL NFR BLD AUTO: 0.4 %
GLUCOSE SERPL-MCNC: 103 MG/DL
HCT VFR BLD CALC: 39.4 %
HDLC SERPL-MCNC: 55 MG/DL
HGB BLD-MCNC: 13 G/DL
IMM GRANULOCYTES NFR BLD AUTO: 0.1 %
LDLC SERPL CALC-MCNC: 78 MG/DL
LYMPHOCYTES # BLD AUTO: 1.69 K/UL
LYMPHOCYTES NFR BLD AUTO: 24.6 %
MAN DIFF?: NORMAL
MCHC RBC-ENTMCNC: 31.5 PG
MCHC RBC-ENTMCNC: 33 GM/DL
MCV RBC AUTO: 95.4 FL
MONOCYTES # BLD AUTO: 0.5 K/UL
MONOCYTES NFR BLD AUTO: 7.3 %
NEUTROPHILS # BLD AUTO: 4.61 K/UL
NEUTROPHILS NFR BLD AUTO: 67.2 %
PLATELET # BLD AUTO: 224 K/UL
POTASSIUM SERPL-SCNC: 5.1 MMOL/L
PROT SERPL-MCNC: 7 G/DL
RBC # BLD: 4.13 M/UL
RBC # FLD: 13.2 %
SODIUM SERPL-SCNC: 140 MMOL/L
TRIGL SERPL-MCNC: 209 MG/DL
URATE SERPL-MCNC: 4.7 MG/DL
WBC # FLD AUTO: 6.87 K/UL

## 2020-07-28 PROCEDURE — 99395 PREV VISIT EST AGE 18-39: CPT | Mod: 25

## 2020-07-28 PROCEDURE — 36415 COLL VENOUS BLD VENIPUNCTURE: CPT

## 2020-07-28 PROCEDURE — 93000 ELECTROCARDIOGRAM COMPLETE: CPT

## 2020-07-28 NOTE — ASSESSMENT
[FreeTextEntry1] : Vital signs were stable. Physical examination was normal. And blood were taken She was also advised to stop smoking

## 2020-07-28 NOTE — HISTORY OF PRESENT ILLNESS
[de-identified] : This is a 25-year-old woman with a history of ADD, bipolar disorder, obstructive sleep apnea, borderline pituitary adenoma who is here today for her annual we'll examination.

## 2020-07-28 NOTE — PHYSICAL EXAM
[Declined Breast Exam] : declined breast exam  [Declined Rectal Exam] : declined rectal exam [Normal] : no posterior cervical lymphadenopathy and no anterior cervical lymphadenopathy

## 2020-07-28 NOTE — HEALTH RISK ASSESSMENT
[] : Yes [0-5] : 0-5 [No] : No [0] : 1) Little interest or pleasure doing things: Not at all (0) [[id number]] : 0

## 2020-07-29 LAB
25(OH)D3 SERPL-MCNC: 24.8 NG/ML
APPEARANCE: CLEAR
BACTERIA: ABNORMAL
BILIRUBIN URINE: NEGATIVE
BLOOD URINE: NEGATIVE
COLOR: YELLOW
ESTIMATED AVERAGE GLUCOSE: 88 MG/DL
FERRITIN SERPL-MCNC: 93 NG/ML
FOLATE SERPL-MCNC: 16.5 NG/ML
GLUCOSE QUALITATIVE U: NEGATIVE
HBA1C MFR BLD HPLC: 4.7 %
HYALINE CASTS: 0 /LPF
KETONES URINE: NORMAL
LEUKOCYTE ESTERASE URINE: NEGATIVE
MICROSCOPIC-UA: NORMAL
NITRITE URINE: NEGATIVE
PH URINE: 6
PROTEIN URINE: NORMAL
RED BLOOD CELLS URINE: 1 /HPF
SPECIFIC GRAVITY URINE: 1.02
SQUAMOUS EPITHELIAL CELLS: 4 /HPF
T3RU NFR SERPL: 1.1 TBI
T4 SERPL-MCNC: 8.6 UG/DL
TSH SERPL-ACNC: 1.2 UIU/ML
UROBILINOGEN URINE: NORMAL
VIT B12 SERPL-MCNC: 363 PG/ML
WHITE BLOOD CELLS URINE: 7 /HPF

## 2020-08-11 ENCOUNTER — APPOINTMENT (OUTPATIENT)
Dept: ENDOCRINOLOGY | Facility: CLINIC | Age: 25
End: 2020-08-11

## 2020-10-22 ENCOUNTER — APPOINTMENT (OUTPATIENT)
Dept: OTOLARYNGOLOGY | Facility: CLINIC | Age: 25
End: 2020-10-22
Payer: COMMERCIAL

## 2020-10-22 VITALS
WEIGHT: 136 LBS | SYSTOLIC BLOOD PRESSURE: 115 MMHG | HEIGHT: 63 IN | BODY MASS INDEX: 24.1 KG/M2 | TEMPERATURE: 97.9 F | DIASTOLIC BLOOD PRESSURE: 70 MMHG | HEART RATE: 69 BPM

## 2020-10-22 DIAGNOSIS — J30.2 OTHER SEASONAL ALLERGIC RHINITIS: ICD-10-CM

## 2020-10-22 PROCEDURE — 99204 OFFICE O/P NEW MOD 45 MIN: CPT | Mod: 25

## 2020-10-22 PROCEDURE — 99072 ADDL SUPL MATRL&STAF TM PHE: CPT

## 2020-10-22 PROCEDURE — 31231 NASAL ENDOSCOPY DX: CPT

## 2020-10-22 NOTE — ASSESSMENT
[FreeTextEntry1] : Patient with a history of difficulty breathing is here for evaluation has no allergies but has difficulty breathing that seems to shift at times.  On examination endoscopically she does have a deviated septum both nasal cavities she is using Flonase we have added Astelin to her regimen to see if that will help.  She is also interested in possible aesthetic refinement of her nose specifically hump reduction as well as straightening out her nasal dorsum.  We discussed pros and cons and the out-of-pocket expense since is not billed to insurance photos were taken she will think about it and get back to us in the near future.
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
## 2020-10-22 NOTE — PHYSICAL EXAM
[Nasal Endoscopy Performed] : nasal endoscopy was performed, see procedure section for findings [] : septum deviated to the left [Midline] : trachea located in midline position [Normal] : no rashes [de-identified] : dorsal nasal deflection to the left

## 2020-10-22 NOTE — CONSULT LETTER
[Dear  ___] : Dear  [unfilled], [Consult Letter:] : I had the pleasure of evaluating your patient, [unfilled]. [Please see my note below.] : Please see my note below. [Consult Closing:] : Thank you very much for allowing me to participate in the care of this patient.  If you have any questions, please do not hesitate to contact me. [Sincerely,] : Sincerely, [FreeTextEntry3] : Erick Danielle MD\par [facility]\par Otolaryngology and Facial Plastics\par [employer], [employer]\par

## 2020-10-29 ENCOUNTER — APPOINTMENT (OUTPATIENT)
Dept: OTOLARYNGOLOGY | Facility: CLINIC | Age: 25
End: 2020-10-29
Payer: COMMERCIAL

## 2020-10-29 VITALS
HEART RATE: 101 BPM | BODY MASS INDEX: 24.1 KG/M2 | WEIGHT: 136 LBS | TEMPERATURE: 97.6 F | DIASTOLIC BLOOD PRESSURE: 85 MMHG | SYSTOLIC BLOOD PRESSURE: 141 MMHG | HEIGHT: 63 IN

## 2020-10-29 DIAGNOSIS — J34.2 DEVIATED NASAL SEPTUM: ICD-10-CM

## 2020-10-29 DIAGNOSIS — R09.81 NASAL CONGESTION: ICD-10-CM

## 2020-10-29 LAB — SARS-COV-2 N GENE NPH QL NAA+PROBE: NOT DETECTED

## 2020-10-29 PROCEDURE — 99214 OFFICE O/P EST MOD 30 MIN: CPT

## 2020-10-29 PROCEDURE — 99072 ADDL SUPL MATRL&STAF TM PHE: CPT

## 2020-10-29 NOTE — ASSESSMENT
[FreeTextEntry1] : Patient follows up for photos we reviewed the indications again for her she does have a depression in her right nasal dorsum on the right side that probably would be repaired with a use of a  graft on the right side unilaterally.  This will help contour.  She does have a deviated septum I told her to hold off on deciding what she wants to do until she decides if she wants to correct the appearance of her nose dorsum.  Not sure any refinement of her dorsum is indicated she is not sure herself will reevaluate her in a month and discuss further at that time.

## 2020-10-29 NOTE — PHYSICAL EXAM
[Nasal Endoscopy Performed] : nasal endoscopy was performed, see procedure section for findings [] : septum deviated to the left [Midline] : trachea located in midline position [Normal] : no rashes [de-identified] : dorsal nasal deflection to the left

## 2020-10-29 NOTE — HISTORY OF PRESENT ILLNESS
[de-identified] : PAtient has used the nasal sprays over the last few weeks with only minor benefit to her bilateral nasal congsetion. SHe still feels as if she is not breathing well through both nostrils. SHe does not have any facial pain or pressure.

## 2020-12-21 DIAGNOSIS — Z20.828 CONTACT WITH AND (SUSPECTED) EXPOSURE TO OTHER VIRAL COMMUNICABLE DISEASES: ICD-10-CM

## 2020-12-24 LAB — SARS-COV-2 N GENE NPH QL NAA+PROBE: NOT DETECTED

## 2021-10-03 ENCOUNTER — TRANSCRIPTION ENCOUNTER (OUTPATIENT)
Age: 26
End: 2021-10-03

## 2021-12-17 ENCOUNTER — APPOINTMENT (OUTPATIENT)
Dept: INTERNAL MEDICINE | Facility: CLINIC | Age: 26
End: 2021-12-17
Payer: COMMERCIAL

## 2021-12-17 VITALS — BODY MASS INDEX: 23.74 KG/M2 | HEIGHT: 63 IN | WEIGHT: 134 LBS

## 2021-12-17 VITALS — DIASTOLIC BLOOD PRESSURE: 80 MMHG | SYSTOLIC BLOOD PRESSURE: 120 MMHG

## 2021-12-17 DIAGNOSIS — M54.31 SCIATICA, RIGHT SIDE: ICD-10-CM

## 2021-12-17 DIAGNOSIS — G47.33 OBSTRUCTIVE SLEEP APNEA (ADULT) (PEDIATRIC): ICD-10-CM

## 2021-12-17 PROCEDURE — 99214 OFFICE O/P EST MOD 30 MIN: CPT

## 2021-12-17 NOTE — PLAN
[FreeTextEntry1] : Assessment\par Back pain with sciatica-my impression is that the patient may have a herniated disc or may have just inflamed her sciatic nerve.  I treated her with Mobic 15 mg with dinner and also Flexeril 10 mg 3 times daily and I informed her that this may make her sleepy in addition I advised her to place cold packs on her back 15 minutes 3 times daily and I referred her to a back specialist\par Sleep apnea-she has seen Dr. Jhon Saunders in consultation\par Mood disorder-this being followed by her psychiatrist

## 2021-12-17 NOTE — HISTORY OF PRESENT ILLNESS
[de-identified] : This is a 26-year-old patient who 4 days ago lifted a 30pound object at work and then developed severe back pain radiating down her right leg.  She denies any weakness of the leg or any muscular deficiency.  The patient also has a history of mood disorder and sleep apnea

## 2021-12-17 NOTE — PHYSICAL EXAM
[Normal] : soft, non-tender, non-distended, no masses palpated, no HSM and normal bowel sounds [de-identified] : The patient's muscle strength is intact in both legs, her reflexes are normal, she has pain on straight leg raise sitting on the right side

## 2021-12-29 ENCOUNTER — APPOINTMENT (OUTPATIENT)
Dept: ORTHOPEDIC SURGERY | Facility: CLINIC | Age: 26
End: 2021-12-29
Payer: OTHER MISCELLANEOUS

## 2021-12-29 VITALS
HEIGHT: 63 IN | DIASTOLIC BLOOD PRESSURE: 76 MMHG | HEART RATE: 102 BPM | SYSTOLIC BLOOD PRESSURE: 120 MMHG | BODY MASS INDEX: 23.74 KG/M2 | WEIGHT: 134 LBS

## 2021-12-29 DIAGNOSIS — M54.50 LOW BACK PAIN, UNSPECIFIED: ICD-10-CM

## 2021-12-29 DIAGNOSIS — M54.2 CERVICALGIA: ICD-10-CM

## 2021-12-29 PROCEDURE — 99203 OFFICE O/P NEW LOW 30 MIN: CPT

## 2021-12-29 PROCEDURE — 99072 ADDL SUPL MATRL&STAF TM PHE: CPT

## 2021-12-30 ENCOUNTER — NON-APPOINTMENT (OUTPATIENT)
Age: 26
End: 2021-12-30

## 2022-01-03 ENCOUNTER — APPOINTMENT (OUTPATIENT)
Dept: MRI IMAGING | Facility: CLINIC | Age: 27
End: 2022-01-03

## 2022-01-06 NOTE — DISCUSSION/SUMMARY
[de-identified] : We discussed further treatment options.  Given her worsening symptoms I recommended MRIs of her cervical, thoracic, lumbar spine.  Follow-up afterwards.

## 2022-01-06 NOTE — PHYSICAL EXAM
[Antalgic] : antalgic [de-identified] : Examination of the cervical spine reveals no midline or paraspinal tenderness to palpation. No cervical lymphadenopathy. Decreased range of motion with respect to flexion, extension, rotation, and lateral bending. Negative Spurlings. Negative Lhermitte's. Full range of motion bilateral shoulders without evidence of impingement. No instability of bilateral upper extremities.  Cranial nerves II through XII grossly intact. Intact sensation bilateral upper extremities. 5/5 deltoids biceps triceps wrist extensors wrist flexors finger flexors and hand intrinsics. 1+ biceps triceps and brachioradialis reflexes. Negative Castro's. 2+ radial pulse. Negative Tinel's over the cubital and carpal tunnel. No skin lesions on the right and left upper extremities.\par \par Examination of the thoracic and lumbar spine reveals no midline tenderness palpation, step-offs, or skin lesions. Decreased range of motion with respect to flexion, extension, lateral bending, and rotation. No tenderness to palpation of the sciatic notch. No tenderness palpation of the bilateral greater trochanters. No pain with passive internal/external rotation of the hips. No instability of bilateral lower extremities.  Negative DARIEL. Negative straight leg raise bilaterally. No bowstring. Negative femoral stretch. 5 out of 5 iliopsoas, hip abductors, hips adductors, quadriceps, hamstrings, gastrocsoleus, tibialis anterior, extensor hallucis longus, peroneals. Grossly intact sensation to light touch bilateral lower extremities. 2-3+ patellar and Achilles reflexes. Downgoing Babinski. No clonus. Intact proprioception. Palpable pulses. No skin lesion and no edema on the right and left lower extremities. [de-identified] : AP lateral scoliosis x-rays does not reveal any obvious fracture dislocation.  No aggressive lesions.  Preserved alignment.

## 2022-01-06 NOTE — HISTORY OF PRESENT ILLNESS
[de-identified] : Ms. HANNY LANCASTER  is a 26 year old female who presents with interscapula and low back pain after lifting a heavy box overhead at work on 12/14/21..  She gets random numbness in her left arm or down both legs.   She feels like her legs are giving out.  Normal bowel and bladder control.   Denies any recent fevers, chills, sweats, weight loss, or infection.  She has been using a muscle relaxer and a nsaid with minimal relief.\par \par The patients past medical history, past surgical history, medications, allergies, and social history were reviewed by me today with the patient and documented accordingly.  In addition, the patient's family history, which is noncontributory to their visit, was also reviewed.\par

## 2022-01-14 ENCOUNTER — APPOINTMENT (OUTPATIENT)
Dept: ORTHOPEDIC SURGERY | Facility: CLINIC | Age: 27
End: 2022-01-14
Payer: OTHER MISCELLANEOUS

## 2022-01-14 PROCEDURE — 99072 ADDL SUPL MATRL&STAF TM PHE: CPT

## 2022-01-14 PROCEDURE — 99214 OFFICE O/P EST MOD 30 MIN: CPT

## 2022-02-25 ENCOUNTER — APPOINTMENT (OUTPATIENT)
Dept: ORTHOPEDIC SURGERY | Facility: CLINIC | Age: 27
End: 2022-02-25

## 2022-02-28 ENCOUNTER — APPOINTMENT (OUTPATIENT)
Dept: ORTHOPEDIC SURGERY | Facility: CLINIC | Age: 27
End: 2022-02-28
Payer: OTHER MISCELLANEOUS

## 2022-02-28 VITALS — BODY MASS INDEX: 23.74 KG/M2 | HEIGHT: 63 IN | WEIGHT: 134 LBS

## 2022-02-28 DIAGNOSIS — M54.16 RADICULOPATHY, LUMBAR REGION: ICD-10-CM

## 2022-02-28 PROCEDURE — 99072 ADDL SUPL MATRL&STAF TM PHE: CPT

## 2022-02-28 PROCEDURE — 99214 OFFICE O/P EST MOD 30 MIN: CPT

## 2022-02-28 NOTE — DISCUSSION/SUMMARY
[de-identified] : We again discussed further treatment options.  Overall she is improving with physical therapy.  She states her numbness and pain is dramatically improved.  We discussed surgical nonsurgical options.  She wished to continue with nonsurgical treatment of form of physical therapy.  She will continue with this.  Follow-up in 4 weeks time for likely clearance to return to work.

## 2022-02-28 NOTE — HISTORY OF PRESENT ILLNESS
[de-identified] : Ms. HANNY LANCASTER is a 26 year old female who presents to the office for a follow-up visit.  She was injured at work on 12/14/21. She has been doing PT which is helping.  Her spine numbness is better.  However, she continues to have spine pain and bilateral leg weakness. \par \par

## 2022-02-28 NOTE — PHYSICAL EXAM
[Antalgic] : not antalgic [de-identified] : Examination of the cervical spine reveals no midline or paraspinal tenderness to palpation. No cervical lymphadenopathy. Decreased range of motion with respect to flexion, extension, rotation, and lateral bending. Negative Spurlings. Negative Lhermitte's. Full range of motion bilateral shoulders without evidence of impingement. No instability of bilateral upper extremities.  Cranial nerves II through XII grossly intact. Intact sensation bilateral upper extremities. 5/5 deltoids biceps triceps wrist extensors wrist flexors finger flexors and hand intrinsics. 1+ biceps triceps and brachioradialis reflexes. Negative Castro's. 2+ radial pulse. Negative Tinel's over the cubital and carpal tunnel. No skin lesions on the right and left upper extremities.\par \par Examination of the thoracic and lumbar spine reveals no midline tenderness palpation, step-offs, or skin lesions. Decreased range of motion with respect to flexion, extension, lateral bending, and rotation. No tenderness to palpation of the sciatic notch. No tenderness palpation of the bilateral greater trochanters. No pain with passive internal/external rotation of the hips. No instability of bilateral lower extremities.  Negative DARIEL. Negative straight leg raise bilaterally. No bowstring. Negative femoral stretch. 5 out of 5 iliopsoas, hip abductors, hips adductors, quadriceps, hamstrings, gastrocsoleus, tibialis anterior, extensor hallucis longus, peroneals. Grossly intact sensation to light touch bilateral lower extremities. 2-3+ patellar and Achilles reflexes. Downgoing Babinski. No clonus. Intact proprioception. Palpable pulses. No skin lesion and no edema on the right and left lower extremities. [de-identified] : MRI C/T/L spine [facility] 01/11/22 reveals a small disc bulge at C5-6 and L5-S1.  She does have a right paracentral T8-9 disc herniation that is not causing any cord compression\par \par AP lateral scoliosis x-rays does not reveal any obvious fracture dislocation.  No aggressive lesions.  Preserved alignment.

## 2022-04-08 ENCOUNTER — APPOINTMENT (OUTPATIENT)
Dept: ORTHOPEDIC SURGERY | Facility: CLINIC | Age: 27
End: 2022-04-08
Payer: OTHER MISCELLANEOUS

## 2022-04-08 VITALS — HEIGHT: 64 IN | WEIGHT: 130 LBS | BODY MASS INDEX: 22.2 KG/M2

## 2022-04-08 DIAGNOSIS — M50.30 OTHER CERVICAL DISC DEGENERATION, UNSPECIFIED CERVICAL REGION: ICD-10-CM

## 2022-04-08 DIAGNOSIS — M51.36 OTHER INTERVERTEBRAL DISC DEGENERATION, LUMBAR REGION: ICD-10-CM

## 2022-04-08 DIAGNOSIS — M54.12 RADICULOPATHY, CERVICAL REGION: ICD-10-CM

## 2022-04-08 DIAGNOSIS — M51.24 OTHER INTERVERTEBRAL DISC DISPLACEMENT, THORACIC REGION: ICD-10-CM

## 2022-04-08 PROCEDURE — 99214 OFFICE O/P EST MOD 30 MIN: CPT

## 2022-04-08 PROCEDURE — 99072 ADDL SUPL MATRL&STAF TM PHE: CPT

## 2022-04-08 RX ORDER — CYCLOBENZAPRINE HYDROCHLORIDE 10 MG/1
10 TABLET, FILM COATED ORAL 3 TIMES DAILY
Qty: 30 | Refills: 0 | Status: ACTIVE | COMMUNITY
Start: 2022-02-28 | End: 1900-01-01

## 2022-04-08 NOTE — DISCUSSION/SUMMARY
[de-identified] : We discussed further treatment options both nonsurgical and surgical.  At this time she is proving with nonsurgical treatment.  She will continue with therapy and the muscle relaxant as needed.  She will be referred to occupational medicine for work capacity evaluation.  She will let me know of any changes or worsening of her symptoms.

## 2022-04-08 NOTE — PHYSICAL EXAM
[Antalgic] : not antalgic [de-identified] : Examination of the cervical spine reveals no midline or paraspinal tenderness to palpation. No cervical lymphadenopathy. Decreased range of motion with respect to flexion, extension, rotation, and lateral bending. Negative Spurlings. Negative Lhermitte's. Full range of motion bilateral shoulders without evidence of impingement. No instability of bilateral upper extremities.  Cranial nerves II through XII grossly intact. Intact sensation bilateral upper extremities. 5/5 deltoids biceps triceps wrist extensors wrist flexors finger flexors and hand intrinsics. 1+ biceps triceps and brachioradialis reflexes. Negative Castro's. 2+ radial pulse. Negative Tinel's over the cubital and carpal tunnel. No skin lesions on the right and left upper extremities.\par \par Examination of the thoracic and lumbar spine reveals no midline tenderness palpation, step-offs, or skin lesions. Decreased range of motion with respect to flexion, extension, lateral bending, and rotation. No tenderness to palpation of the sciatic notch. No tenderness palpation of the bilateral greater trochanters. No pain with passive internal/external rotation of the hips. No instability of bilateral lower extremities.  Negative DARIEL. Negative straight leg raise bilaterally. No bowstring. Negative femoral stretch. 5 out of 5 iliopsoas, hip abductors, hips adductors, quadriceps, hamstrings, gastrocsoleus, tibialis anterior, extensor hallucis longus, peroneals. Grossly intact sensation to light touch bilateral lower extremities. 2-3+ patellar and Achilles reflexes. Downgoing Babinski. No clonus. Intact proprioception. Palpable pulses. No skin lesion and no edema on the right and left lower extremities. [de-identified] : MRI C/T/L spine [facility] 01/11/22 reveals a small disc bulge at C5-6 and L5-S1.  She does have a right paracentral T8-9 disc herniation that is not causing any cord compression\par \par AP lateral scoliosis x-rays does not reveal any obvious fracture dislocation.  No aggressive lesions.  Preserved alignment.

## 2022-04-08 NOTE — HISTORY OF PRESENT ILLNESS
[de-identified] : Ms. HANNY LANCASTER is a 26 year old female who presents to the office for a follow-up visit.  She was injured at work on 12/14/21. She has been doing PT which is helping.  Her spine numbness is better.  However, she continues to have spine pain and bilateral leg weakness. \par \par

## 2022-04-12 ENCOUNTER — FORM ENCOUNTER (OUTPATIENT)
Age: 27
End: 2022-04-12

## 2022-04-13 ENCOUNTER — FORM ENCOUNTER (OUTPATIENT)
Age: 27
End: 2022-04-13

## 2022-04-18 ENCOUNTER — NON-APPOINTMENT (OUTPATIENT)
Age: 27
End: 2022-04-18

## 2022-04-29 ENCOUNTER — APPOINTMENT (OUTPATIENT)
Dept: INTERNAL MEDICINE | Facility: CLINIC | Age: 27
End: 2022-04-29
Payer: COMMERCIAL

## 2022-04-29 VITALS
WEIGHT: 142 LBS | SYSTOLIC BLOOD PRESSURE: 111 MMHG | HEIGHT: 64 IN | BODY MASS INDEX: 24.24 KG/M2 | DIASTOLIC BLOOD PRESSURE: 73 MMHG | HEART RATE: 105 BPM

## 2022-04-29 DIAGNOSIS — D35.2 BENIGN NEOPLASM OF PITUITARY GLAND: ICD-10-CM

## 2022-04-29 DIAGNOSIS — R60.9 EDEMA, UNSPECIFIED: ICD-10-CM

## 2022-04-29 DIAGNOSIS — F31.70 BIPOLAR DISORDER, CURRENTLY IN REMISSION, MOST RECENT EPISODE UNSPECIFIED: ICD-10-CM

## 2022-04-29 PROCEDURE — 36415 COLL VENOUS BLD VENIPUNCTURE: CPT

## 2022-04-29 PROCEDURE — 99214 OFFICE O/P EST MOD 30 MIN: CPT | Mod: 25

## 2022-04-29 RX ORDER — LAMOTRIGINE 100 MG/1
100 TABLET ORAL TWICE DAILY
Refills: 0 | Status: ACTIVE | COMMUNITY

## 2022-04-29 RX ORDER — PROPRANOLOL HYDROCHLORIDE 10 MG/1
10 TABLET ORAL TWICE DAILY
Refills: 0 | Status: DISCONTINUED | COMMUNITY
Start: 2018-12-14 | End: 2022-04-29

## 2022-04-29 RX ORDER — CYCLOBENZAPRINE HYDROCHLORIDE 10 MG/1
10 TABLET, FILM COATED ORAL 3 TIMES DAILY
Qty: 30 | Refills: 0 | Status: DISCONTINUED | COMMUNITY
Start: 2022-01-14 | End: 2022-04-29

## 2022-04-29 RX ORDER — CYCLOBENZAPRINE HYDROCHLORIDE 10 MG/1
10 TABLET, FILM COATED ORAL
Qty: 30 | Refills: 3 | Status: DISCONTINUED | COMMUNITY
Start: 2021-12-17 | End: 2022-04-29

## 2022-04-29 RX ORDER — MELOXICAM 15 MG/1
15 TABLET ORAL DAILY
Qty: 20 | Refills: 0 | Status: DISCONTINUED | COMMUNITY
Start: 2021-12-17 | End: 2022-04-29

## 2022-04-29 RX ORDER — NORTRIPTYLINE HYDROCHLORIDE 25 MG/1
25 CAPSULE ORAL
Refills: 0 | Status: ACTIVE | COMMUNITY

## 2022-04-29 RX ORDER — DULOXETINE HYDROCHLORIDE 60 MG/1
60 CAPSULE, DELAYED RELEASE PELLETS ORAL
Refills: 0 | Status: ACTIVE | COMMUNITY
Start: 2020-04-20

## 2022-04-29 NOTE — HISTORY OF PRESENT ILLNESS
[FreeTextEntry8] : 27 year old female with c/o swelling. \par She states that she has had Noticeable swelling for the past two weeks. It started with the abdomen. She has no joint pain. She has no SOB. She has no other symptoms. \par She did take two pregnancy tests, both negative. \par

## 2022-04-29 NOTE — ASSESSMENT
[FreeTextEntry1] : No edema on exam, unsure of etiology \par will do inflammatory labs \par \par will fu \par \par watch salty foods

## 2022-05-09 LAB
25(OH)D3 SERPL-MCNC: 17.2 NG/ML
ALBUMIN SERPL ELPH-MCNC: 4.4 G/DL
ALP BLD-CCNC: 41 U/L
ALT SERPL-CCNC: 12 U/L
ANA SER IF-ACNC: NEGATIVE
ANION GAP SERPL CALC-SCNC: 13 MMOL/L
AST SERPL-CCNC: 14 U/L
BASOPHILS # BLD AUTO: 0.03 K/UL
BASOPHILS NFR BLD AUTO: 0.9 %
BILIRUB SERPL-MCNC: 0.3 MG/DL
BUN SERPL-MCNC: 13 MG/DL
CALCIUM SERPL-MCNC: 9.4 MG/DL
CHLORIDE SERPL-SCNC: 107 MMOL/L
CHOLEST SERPL-MCNC: 180 MG/DL
CO2 SERPL-SCNC: 20 MMOL/L
CREAT SERPL-MCNC: 0.81 MG/DL
CRP SERPL-MCNC: 3 MG/L
EGFR: 101 ML/MIN/1.73M2
EOSINOPHIL # BLD AUTO: 0.13 K/UL
EOSINOPHIL NFR BLD AUTO: 3.7 %
ERYTHROCYTE [SEDIMENTATION RATE] IN BLOOD BY WESTERGREN METHOD: 4 MM/HR
ESTIMATED AVERAGE GLUCOSE: 100 MG/DL
FERRITIN SERPL-MCNC: 45 NG/ML
FOLATE SERPL-MCNC: 18.8 NG/ML
GLUCOSE SERPL-MCNC: 85 MG/DL
HBA1C MFR BLD HPLC: 5.1 %
HCG SERPL-MCNC: <1 MIU/ML
HCT VFR BLD CALC: 36.5 %
HDLC SERPL-MCNC: 54 MG/DL
HGB BLD-MCNC: 11.7 G/DL
IMM GRANULOCYTES NFR BLD AUTO: 0.3 %
IRON SATN MFR SERPL: 28 %
IRON SERPL-MCNC: 118 UG/DL
LDLC SERPL CALC-MCNC: 102 MG/DL
LYMPHOCYTES # BLD AUTO: 1.32 K/UL
LYMPHOCYTES NFR BLD AUTO: 37.6 %
MAN DIFF?: NORMAL
MCHC RBC-ENTMCNC: 30.2 PG
MCHC RBC-ENTMCNC: 32.1 GM/DL
MCV RBC AUTO: 94.3 FL
MONOCYTES # BLD AUTO: 0.28 K/UL
MONOCYTES NFR BLD AUTO: 8 %
NEUTROPHILS # BLD AUTO: 1.74 K/UL
NEUTROPHILS NFR BLD AUTO: 49.5 %
NONHDLC SERPL-MCNC: 126 MG/DL
PLATELET # BLD AUTO: 228 K/UL
POTASSIUM SERPL-SCNC: 4.7 MMOL/L
PROT SERPL-MCNC: 6.2 G/DL
RBC # BLD: 3.87 M/UL
RBC # FLD: 13.2 %
SODIUM SERPL-SCNC: 139 MMOL/L
TIBC SERPL-MCNC: 422 UG/DL
TRIGL SERPL-MCNC: 122 MG/DL
UIBC SERPL-MCNC: 304 UG/DL
VIT B12 SERPL-MCNC: 327 PG/ML
WBC # FLD AUTO: 3.51 K/UL

## 2022-05-26 ENCOUNTER — APPOINTMENT (OUTPATIENT)
Dept: INTERNAL MEDICINE | Facility: CLINIC | Age: 27
End: 2022-05-26

## 2023-02-11 ENCOUNTER — EMERGENCY (EMERGENCY)
Facility: HOSPITAL | Age: 28
LOS: 1 days | Discharge: HOME | End: 2023-02-11
Attending: EMERGENCY MEDICINE
Payer: COMMERCIAL

## 2023-02-11 VITALS
HEART RATE: 68 BPM | SYSTOLIC BLOOD PRESSURE: 103 MMHG | RESPIRATION RATE: 16 BRPM | DIASTOLIC BLOOD PRESSURE: 69 MMHG | OXYGEN SATURATION: 97 % | TEMPERATURE: 98 F

## 2023-02-11 VITALS
TEMPERATURE: 98 F | DIASTOLIC BLOOD PRESSURE: 65 MMHG | WEIGHT: 117.95 LBS | OXYGEN SATURATION: 97 % | HEIGHT: 64 IN | HEART RATE: 105 BPM | SYSTOLIC BLOOD PRESSURE: 103 MMHG | RESPIRATION RATE: 18 BRPM

## 2023-02-11 LAB
ALBUMIN SERPL ELPH-MCNC: 4.6 G/DL — SIGNIFICANT CHANGE UP (ref 3.3–5)
ALP SERPL-CCNC: 57 U/L — SIGNIFICANT CHANGE UP (ref 40–120)
ALT FLD-CCNC: 14 U/L — SIGNIFICANT CHANGE UP (ref 10–45)
ANION GAP SERPL CALC-SCNC: 13 MMOL/L — SIGNIFICANT CHANGE UP (ref 5–17)
AST SERPL-CCNC: 20 U/L — SIGNIFICANT CHANGE UP (ref 10–40)
BASOPHILS # BLD AUTO: 0.04 K/UL — SIGNIFICANT CHANGE UP (ref 0–0.2)
BASOPHILS NFR BLD AUTO: 0.6 % — SIGNIFICANT CHANGE UP (ref 0–2)
BILIRUB SERPL-MCNC: 0.5 MG/DL — SIGNIFICANT CHANGE UP (ref 0.2–1.2)
BUN SERPL-MCNC: 18 MG/DL — SIGNIFICANT CHANGE UP (ref 7–23)
CALCIUM SERPL-MCNC: 9.2 MG/DL — SIGNIFICANT CHANGE UP (ref 8.4–10.5)
CHLORIDE SERPL-SCNC: 105 MMOL/L — SIGNIFICANT CHANGE UP (ref 96–108)
CO2 SERPL-SCNC: 21 MMOL/L — LOW (ref 22–31)
CREAT SERPL-MCNC: 0.7 MG/DL — SIGNIFICANT CHANGE UP (ref 0.5–1.3)
EGFR: 120 ML/MIN/1.73M2 — SIGNIFICANT CHANGE UP
EOSINOPHIL # BLD AUTO: 0.09 K/UL — SIGNIFICANT CHANGE UP (ref 0–0.5)
EOSINOPHIL NFR BLD AUTO: 1.4 % — SIGNIFICANT CHANGE UP (ref 0–6)
ETHANOL SERPL-MCNC: <10 MG/DL — SIGNIFICANT CHANGE UP (ref 0–10)
GLUCOSE SERPL-MCNC: 88 MG/DL — SIGNIFICANT CHANGE UP (ref 70–99)
HCT VFR BLD CALC: 34.6 % — SIGNIFICANT CHANGE UP (ref 34.5–45)
HGB BLD-MCNC: 11.3 G/DL — LOW (ref 11.5–15.5)
IMM GRANULOCYTES NFR BLD AUTO: 0.2 % — SIGNIFICANT CHANGE UP (ref 0–0.9)
LYMPHOCYTES # BLD AUTO: 2.2 K/UL — SIGNIFICANT CHANGE UP (ref 1–3.3)
LYMPHOCYTES # BLD AUTO: 34.8 % — SIGNIFICANT CHANGE UP (ref 13–44)
MCHC RBC-ENTMCNC: 29.9 PG — SIGNIFICANT CHANGE UP (ref 27–34)
MCHC RBC-ENTMCNC: 32.7 GM/DL — SIGNIFICANT CHANGE UP (ref 32–36)
MCV RBC AUTO: 91.5 FL — SIGNIFICANT CHANGE UP (ref 80–100)
MONOCYTES # BLD AUTO: 0.71 K/UL — SIGNIFICANT CHANGE UP (ref 0–0.9)
MONOCYTES NFR BLD AUTO: 11.2 % — SIGNIFICANT CHANGE UP (ref 2–14)
NEUTROPHILS # BLD AUTO: 3.28 K/UL — SIGNIFICANT CHANGE UP (ref 1.8–7.4)
NEUTROPHILS NFR BLD AUTO: 51.8 % — SIGNIFICANT CHANGE UP (ref 43–77)
NRBC # BLD: 0 /100 WBCS — SIGNIFICANT CHANGE UP (ref 0–0)
PLATELET # BLD AUTO: 258 K/UL — SIGNIFICANT CHANGE UP (ref 150–400)
POTASSIUM SERPL-MCNC: 4 MMOL/L — SIGNIFICANT CHANGE UP (ref 3.5–5.3)
POTASSIUM SERPL-SCNC: 4 MMOL/L — SIGNIFICANT CHANGE UP (ref 3.5–5.3)
PROT SERPL-MCNC: 6.6 G/DL — SIGNIFICANT CHANGE UP (ref 6–8.3)
RBC # BLD: 3.78 M/UL — LOW (ref 3.8–5.2)
RBC # FLD: 13.1 % — SIGNIFICANT CHANGE UP (ref 10.3–14.5)
SODIUM SERPL-SCNC: 139 MMOL/L — SIGNIFICANT CHANGE UP (ref 135–145)
WBC # BLD: 6.33 K/UL — SIGNIFICANT CHANGE UP (ref 3.8–10.5)
WBC # FLD AUTO: 6.33 K/UL — SIGNIFICANT CHANGE UP (ref 3.8–10.5)

## 2023-02-11 PROCEDURE — 96374 THER/PROPH/DIAG INJ IV PUSH: CPT

## 2023-02-11 PROCEDURE — 99284 EMERGENCY DEPT VISIT MOD MDM: CPT

## 2023-02-11 PROCEDURE — 71045 X-RAY EXAM CHEST 1 VIEW: CPT

## 2023-02-11 PROCEDURE — 99053 MED SERV 10PM-8AM 24 HR FAC: CPT

## 2023-02-11 PROCEDURE — 70450 CT HEAD/BRAIN W/O DYE: CPT | Mod: MA

## 2023-02-11 PROCEDURE — 70450 CT HEAD/BRAIN W/O DYE: CPT | Mod: 26,MA

## 2023-02-11 PROCEDURE — 80307 DRUG TEST PRSMV CHEM ANLYZR: CPT

## 2023-02-11 PROCEDURE — 36415 COLL VENOUS BLD VENIPUNCTURE: CPT

## 2023-02-11 PROCEDURE — 80053 COMPREHEN METABOLIC PANEL: CPT

## 2023-02-11 PROCEDURE — 85025 COMPLETE CBC W/AUTO DIFF WBC: CPT

## 2023-02-11 PROCEDURE — 90471 IMMUNIZATION ADMIN: CPT

## 2023-02-11 PROCEDURE — 71045 X-RAY EXAM CHEST 1 VIEW: CPT | Mod: 26

## 2023-02-11 PROCEDURE — 90715 TDAP VACCINE 7 YRS/> IM: CPT

## 2023-02-11 PROCEDURE — 99284 EMERGENCY DEPT VISIT MOD MDM: CPT | Mod: 25

## 2023-02-11 RX ORDER — SODIUM CHLORIDE 9 MG/ML
1000 INJECTION INTRAMUSCULAR; INTRAVENOUS; SUBCUTANEOUS ONCE
Refills: 0 | Status: COMPLETED | OUTPATIENT
Start: 2023-02-11 | End: 2023-02-11

## 2023-02-11 RX ORDER — ACETAMINOPHEN 500 MG
1000 TABLET ORAL ONCE
Refills: 0 | Status: COMPLETED | OUTPATIENT
Start: 2023-02-11 | End: 2023-02-11

## 2023-02-11 RX ORDER — TETANUS TOXOID, REDUCED DIPHTHERIA TOXOID AND ACELLULAR PERTUSSIS VACCINE, ADSORBED 5; 2.5; 8; 8; 2.5 [IU]/.5ML; [IU]/.5ML; UG/.5ML; UG/.5ML; UG/.5ML
0.5 SUSPENSION INTRAMUSCULAR ONCE
Refills: 0 | Status: COMPLETED | OUTPATIENT
Start: 2023-02-11 | End: 2023-02-11

## 2023-02-11 RX ADMIN — Medication 400 MILLIGRAM(S): at 02:36

## 2023-02-11 RX ADMIN — TETANUS TOXOID, REDUCED DIPHTHERIA TOXOID AND ACELLULAR PERTUSSIS VACCINE, ADSORBED 0.5 MILLILITER(S): 5; 2.5; 8; 8; 2.5 SUSPENSION INTRAMUSCULAR at 06:33

## 2023-02-11 RX ADMIN — SODIUM CHLORIDE 1000 MILLILITER(S): 9 INJECTION INTRAMUSCULAR; INTRAVENOUS; SUBCUTANEOUS at 02:36

## 2023-02-11 NOTE — ED ADULT NURSE NOTE - CAS DISCH TRANSFER METHOD
Received call from Ursula Menjivar at [facility] with Red Flag Complaint. Subjective: Caller states \"3 weeks ago I was seen in the office because I was losing weight and I would get these intense hot flashes and vomiting. I bent over on 8/10 and I felt a big pop on my rib and it became an awful pain and I went to ED. Today my rib is still sore, and my right shoulder hurts. I have dark jelly like stool\"     Current Symptoms: black stool right sided shoulder pain, resting HR is 125, pain in her ribs, COVID was negative    Onset: black stool started today    Pain Severity: \"pain is high with my ribs\"    Temperature: denies     What has been tried: Motrin and Tylenol     LMP: NA Pregnant: NA    Recommended disposition: Go to ED Now    Care advice provided, patient verbalizes understanding; denies any other questions or concerns; instructed to call back for any new or worsening symptoms. Patient/caller agrees to proceed to . Emergency Department     Attention Provider: Thank you for allowing me to participate in the care of your patient. The patient was connected to triage in response to information provided to the ECC/PSC. Please do not respond through this encounter as the response is not directed to a shared pool.       Reason for Disposition   Bloody, black, or tarry bowel movements  (Exception: Chronic-unchanged black-grey bowel movements and is taking iron pills or Pepto-Bismol.)    Protocols used: Rectal Bleeding-ADULT-[place] Private car
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
## 2023-02-11 NOTE — ED PROVIDER NOTE - PATIENT PORTAL LINK FT
You can access the FollowMyHealth Patient Portal offered by [facility] by registering at the following website: [email or web address]. By joining [employer]’s FollowMyHealth portal, you will also be able to view your health information using other applications (apps) compatible with our system.

## 2023-02-11 NOTE — ED PROVIDER NOTE - PROGRESS NOTE DETAILS
Henrik: No emergent findings on labs and CT scan, patient well-appearing stable for discharge we will send home in care of her father who is at bedside here.  Return precautions given, copy of results given.
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
## 2023-02-11 NOTE — ED PROVIDER NOTE - PHYSICAL EXAMINATION
Vitals: I have reviewed the patients vital signs  General: nontoxic apperaing  HEENT: forehead abrasions, normocephalic, airway patent  Eyes: EOMI, tracking appropriately, 5mm reactive  Neck: no tracheal deviation, no JVD  Chest/Lungs: no seatbelt sign, no ttp symmetric chest rise, speaking in complete sentences, no WOB  Abd: SNT  Heart: skin and extremities well perfused, regular rate and rhythm  Neuro: A+Ox3, CN grossly intact 5/5 extremities, slightly slurred speech  MSK: strength at baseline in all extremities, no muscle wasting or atrophy, ranging UE and LE without difficulty no focal joint pain  Skin: abrasions on forehead
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
## 2023-06-19 ENCOUNTER — APPOINTMENT (OUTPATIENT)
Dept: INTERNAL MEDICINE | Facility: CLINIC | Age: 28
End: 2023-06-19

## 2023-07-07 ENCOUNTER — APPOINTMENT (OUTPATIENT)
Dept: INTERNAL MEDICINE | Facility: CLINIC | Age: 28
End: 2023-07-07

## 2023-09-12 ENCOUNTER — APPOINTMENT (OUTPATIENT)
Dept: INTERNAL MEDICINE | Facility: CLINIC | Age: 28
End: 2023-09-12

## 2024-07-24 ENCOUNTER — TRANSCRIPTION ENCOUNTER (OUTPATIENT)
Age: 29
End: 2024-07-24

## 2024-07-26 ENCOUNTER — NON-APPOINTMENT (OUTPATIENT)
Age: 29
End: 2024-07-26

## 2024-07-26 PROBLEM — D64.9 ANEMIA, UNSPECIFIED: Chronic | Status: ACTIVE | Noted: 2024-07-19

## 2024-07-26 PROBLEM — F31.9 BIPOLAR DISORDER, UNSPECIFIED: Chronic | Status: ACTIVE | Noted: 2024-07-19

## 2024-08-01 ENCOUNTER — APPOINTMENT (OUTPATIENT)
Dept: INTERNAL MEDICINE | Facility: CLINIC | Age: 29
End: 2024-08-01

## 2024-09-06 ENCOUNTER — APPOINTMENT (OUTPATIENT)
Dept: INTERNAL MEDICINE | Facility: CLINIC | Age: 29
End: 2024-09-06

## 2024-10-07 ENCOUNTER — APPOINTMENT (OUTPATIENT)
Dept: INTERNAL MEDICINE | Facility: CLINIC | Age: 29
End: 2024-10-07
Payer: MEDICAID

## 2024-10-07 PROCEDURE — 36415 COLL VENOUS BLD VENIPUNCTURE: CPT

## 2024-10-12 ENCOUNTER — NON-APPOINTMENT (OUTPATIENT)
Age: 29
End: 2024-10-12

## 2024-10-12 ENCOUNTER — APPOINTMENT (OUTPATIENT)
Dept: INTERNAL MEDICINE | Facility: CLINIC | Age: 29
End: 2024-10-12

## 2024-10-12 VITALS
DIASTOLIC BLOOD PRESSURE: 60 MMHG | HEART RATE: 78 BPM | WEIGHT: 128 LBS | TEMPERATURE: 98.2 F | BODY MASS INDEX: 22.4 KG/M2 | HEIGHT: 63.39 IN | OXYGEN SATURATION: 99 % | SYSTOLIC BLOOD PRESSURE: 109 MMHG

## 2024-10-12 DIAGNOSIS — G47.33 OBSTRUCTIVE SLEEP APNEA (ADULT) (PEDIATRIC): ICD-10-CM

## 2024-10-12 DIAGNOSIS — Z81.3 FAMILY HISTORY OF OTHER PSYCHOACTIVE SUBSTANCE ABUSE AND DEPENDENCE: ICD-10-CM

## 2024-10-12 DIAGNOSIS — Z82.49 FAMILY HISTORY OF ISCHEMIC HEART DISEASE AND OTHER DISEASES OF THE CIRCULATORY SYSTEM: ICD-10-CM

## 2024-10-12 DIAGNOSIS — Z83.438 FAMILY HISTORY OF OTHER DISORDER OF LIPOPROTEIN METABOLISM AND OTHER LIPIDEMIA: ICD-10-CM

## 2024-10-12 DIAGNOSIS — D64.9 ANEMIA, UNSPECIFIED: ICD-10-CM

## 2024-10-12 DIAGNOSIS — D35.2 BENIGN NEOPLASM OF PITUITARY GLAND: ICD-10-CM

## 2024-10-12 DIAGNOSIS — F19.90 OTHER PSYCHOACTIVE SUBSTANCE USE, UNSPECIFIED, UNCOMPLICATED: ICD-10-CM

## 2024-10-12 DIAGNOSIS — Z83.3 FAMILY HISTORY OF DIABETES MELLITUS: ICD-10-CM

## 2024-10-12 DIAGNOSIS — F31.70 BIPOLAR DISORDER, CURRENTLY IN REMISSION, MOST RECENT EPISODE UNSPECIFIED: ICD-10-CM

## 2024-10-12 DIAGNOSIS — F14.90 COCAINE USE, UNSPECIFIED, UNCOMPLICATED: ICD-10-CM

## 2024-10-12 DIAGNOSIS — G47.52 REM SLEEP BEHAVIOR DISORDER: ICD-10-CM

## 2024-10-12 DIAGNOSIS — F32.A DEPRESSION, UNSPECIFIED: ICD-10-CM

## 2024-10-12 DIAGNOSIS — F41.9 ANXIETY DISORDER, UNSPECIFIED: ICD-10-CM

## 2024-10-12 DIAGNOSIS — F51.3 SLEEPWALKING [SOMNAMBULISM]: ICD-10-CM

## 2024-10-12 DIAGNOSIS — F98.8 OTHER SPECIFIED BEHAVIORAL AND EMOTIONAL DISORDERS WITH ONSET USUALLY OCCURRING IN CHILDHOOD AND ADOLESCENCE: ICD-10-CM

## 2024-10-12 DIAGNOSIS — Z81.8 FAMILY HISTORY OF OTHER MENTAL AND BEHAVIORAL DISORDERS: ICD-10-CM

## 2024-10-12 PROCEDURE — 99407 BEHAV CHNG SMOKING > 10 MIN: CPT

## 2024-10-12 PROCEDURE — 93000 ELECTROCARDIOGRAM COMPLETE: CPT | Mod: 59

## 2024-10-12 PROCEDURE — 99215 OFFICE O/P EST HI 40 MIN: CPT | Mod: 25

## 2024-10-12 PROCEDURE — 99395 PREV VISIT EST AGE 18-39: CPT | Mod: 25

## 2024-10-12 PROCEDURE — G0444 DEPRESSION SCREEN ANNUAL: CPT | Mod: 59

## 2024-10-28 ENCOUNTER — APPOINTMENT (OUTPATIENT)
Dept: OTOLARYNGOLOGY | Facility: CLINIC | Age: 29
End: 2024-10-28

## 2024-11-06 ENCOUNTER — EMERGENCY (EMERGENCY)
Facility: HOSPITAL | Age: 29
LOS: 1 days | Discharge: HOME | End: 2024-11-06
Attending: EMERGENCY MEDICINE
Payer: MEDICAID

## 2024-11-06 VITALS
HEIGHT: 64 IN | HEART RATE: 92 BPM | OXYGEN SATURATION: 100 % | SYSTOLIC BLOOD PRESSURE: 131 MMHG | WEIGHT: 119.93 LBS | RESPIRATION RATE: 18 BRPM | TEMPERATURE: 99 F | DIASTOLIC BLOOD PRESSURE: 71 MMHG

## 2024-11-06 VITALS
SYSTOLIC BLOOD PRESSURE: 124 MMHG | DIASTOLIC BLOOD PRESSURE: 75 MMHG | RESPIRATION RATE: 18 BRPM | OXYGEN SATURATION: 99 % | HEART RATE: 79 BPM | TEMPERATURE: 99 F

## 2024-11-06 LAB
ALBUMIN SERPL ELPH-MCNC: 4.5 G/DL — SIGNIFICANT CHANGE UP (ref 3.3–5)
ALP SERPL-CCNC: 66 U/L — SIGNIFICANT CHANGE UP (ref 40–120)
ALT FLD-CCNC: 10 U/L — SIGNIFICANT CHANGE UP (ref 10–45)
ANION GAP SERPL CALC-SCNC: 16 MMOL/L — SIGNIFICANT CHANGE UP (ref 5–17)
AST SERPL-CCNC: 16 U/L — SIGNIFICANT CHANGE UP (ref 10–40)
BASOPHILS # BLD AUTO: 0.04 K/UL — SIGNIFICANT CHANGE UP (ref 0–0.2)
BASOPHILS NFR BLD AUTO: 0.4 % — SIGNIFICANT CHANGE UP (ref 0–2)
BILIRUB SERPL-MCNC: 0.2 MG/DL — SIGNIFICANT CHANGE UP (ref 0.2–1.2)
BUN SERPL-MCNC: 15 MG/DL — SIGNIFICANT CHANGE UP (ref 7–23)
CALCIUM SERPL-MCNC: 9.1 MG/DL — SIGNIFICANT CHANGE UP (ref 8.4–10.5)
CHLORIDE SERPL-SCNC: 104 MMOL/L — SIGNIFICANT CHANGE UP (ref 96–108)
CO2 SERPL-SCNC: 17 MMOL/L — LOW (ref 22–31)
CREAT SERPL-MCNC: 0.7 MG/DL — SIGNIFICANT CHANGE UP (ref 0.5–1.3)
EGFR: 119 ML/MIN/1.73M2 — SIGNIFICANT CHANGE UP
EOSINOPHIL # BLD AUTO: 0.02 K/UL — SIGNIFICANT CHANGE UP (ref 0–0.5)
EOSINOPHIL NFR BLD AUTO: 0.2 % — SIGNIFICANT CHANGE UP (ref 0–6)
GLUCOSE SERPL-MCNC: 92 MG/DL — SIGNIFICANT CHANGE UP (ref 70–99)
HCG SERPL-ACNC: <2 MIU/ML — SIGNIFICANT CHANGE UP
HCT VFR BLD CALC: 43.2 % — SIGNIFICANT CHANGE UP (ref 34.5–45)
HGB BLD-MCNC: 13.3 G/DL — SIGNIFICANT CHANGE UP (ref 11.5–15.5)
IMM GRANULOCYTES NFR BLD AUTO: 0.5 % — SIGNIFICANT CHANGE UP (ref 0–0.9)
LYMPHOCYTES # BLD AUTO: 1.23 K/UL — SIGNIFICANT CHANGE UP (ref 1–3.3)
LYMPHOCYTES # BLD AUTO: 12.4 % — LOW (ref 13–44)
MCHC RBC-ENTMCNC: 28.9 PG — SIGNIFICANT CHANGE UP (ref 27–34)
MCHC RBC-ENTMCNC: 30.8 G/DL — LOW (ref 32–36)
MCV RBC AUTO: 93.7 FL — SIGNIFICANT CHANGE UP (ref 80–100)
MONOCYTES # BLD AUTO: 0.9 K/UL — SIGNIFICANT CHANGE UP (ref 0–0.9)
MONOCYTES NFR BLD AUTO: 9.1 % — SIGNIFICANT CHANGE UP (ref 2–14)
NEUTROPHILS # BLD AUTO: 7.69 K/UL — HIGH (ref 1.8–7.4)
NEUTROPHILS NFR BLD AUTO: 77.4 % — HIGH (ref 43–77)
NRBC # BLD: 0 /100 WBCS — SIGNIFICANT CHANGE UP (ref 0–0)
PLATELET # BLD AUTO: 209 K/UL — SIGNIFICANT CHANGE UP (ref 150–400)
POTASSIUM SERPL-MCNC: 4.4 MMOL/L — SIGNIFICANT CHANGE UP (ref 3.5–5.3)
POTASSIUM SERPL-SCNC: 4.4 MMOL/L — SIGNIFICANT CHANGE UP (ref 3.5–5.3)
PROT SERPL-MCNC: 7.5 G/DL — SIGNIFICANT CHANGE UP (ref 6–8.3)
RBC # BLD: 4.61 M/UL — SIGNIFICANT CHANGE UP (ref 3.8–5.2)
RBC # FLD: 13.2 % — SIGNIFICANT CHANGE UP (ref 10.3–14.5)
SODIUM SERPL-SCNC: 137 MMOL/L — SIGNIFICANT CHANGE UP (ref 135–145)
WBC # BLD: 9.93 K/UL — SIGNIFICANT CHANGE UP (ref 3.8–10.5)
WBC # FLD AUTO: 9.93 K/UL — SIGNIFICANT CHANGE UP (ref 3.8–10.5)

## 2024-11-06 PROCEDURE — 99284 EMERGENCY DEPT VISIT MOD MDM: CPT | Mod: 25

## 2024-11-06 PROCEDURE — 96374 THER/PROPH/DIAG INJ IV PUSH: CPT

## 2024-11-06 PROCEDURE — 84702 CHORIONIC GONADOTROPIN TEST: CPT

## 2024-11-06 PROCEDURE — 87070 CULTURE OTHR SPECIMN AEROBIC: CPT

## 2024-11-06 PROCEDURE — 80053 COMPREHEN METABOLIC PANEL: CPT

## 2024-11-06 PROCEDURE — 87186 SC STD MICRODIL/AGAR DIL: CPT

## 2024-11-06 PROCEDURE — 10061 I&D ABSCESS COMP/MULTIPLE: CPT

## 2024-11-06 PROCEDURE — 87077 CULTURE AEROBIC IDENTIFY: CPT

## 2024-11-06 PROCEDURE — 85025 COMPLETE CBC W/AUTO DIFF WBC: CPT

## 2024-11-06 RX ORDER — LIDOCAINE HCL 60 MG/3 ML
4 SYRINGE (ML) INJECTION ONCE
Refills: 0 | Status: DISCONTINUED | OUTPATIENT
Start: 2024-11-06 | End: 2024-11-06

## 2024-11-06 RX ORDER — LIDOCAINE/PRILOCAINE 2.5 %-2.5%
1 CREAM (GRAM) TOPICAL ONCE
Refills: 0 | Status: DISCONTINUED | OUTPATIENT
Start: 2024-11-06 | End: 2024-11-06

## 2024-11-06 RX ORDER — SULFAMETHOXAZOLE/TRIMETHOPRIM 800-160 MG
1 TABLET ORAL
Qty: 20 | Refills: 0
Start: 2024-11-06 | End: 2024-11-15

## 2024-11-06 RX ORDER — SULFAMETHOXAZOLE/TRIMETHOPRIM 800-160 MG
1 TABLET ORAL ONCE
Refills: 0 | Status: COMPLETED | OUTPATIENT
Start: 2024-11-06 | End: 2024-11-06

## 2024-11-06 RX ORDER — DOXYCYCLINE HYCLATE 100 MG/1
100 TABLET, FILM COATED ORAL EVERY 12 HOURS
Refills: 0 | Status: ACTIVE | OUTPATIENT
Start: 2024-11-06 | End: 2025-10-05

## 2024-11-06 RX ORDER — ACETAMINOPHEN 500 MG
1000 TABLET ORAL ONCE
Refills: 0 | Status: COMPLETED | OUTPATIENT
Start: 2024-11-06 | End: 2024-11-06

## 2024-11-06 RX ADMIN — DOXYCYCLINE HYCLATE 100 MILLIGRAM(S): 100 TABLET, FILM COATED ORAL at 13:12

## 2024-11-06 RX ADMIN — Medication 10 MILLILITER(S): at 16:38

## 2024-11-06 RX ADMIN — Medication 1 TABLET(S): at 17:38

## 2024-11-06 RX ADMIN — Medication 400 MILLIGRAM(S): at 13:12

## 2024-11-06 NOTE — ED PROVIDER NOTE - ATTENDING CONTRIBUTION TO CARE
attending Ty: 29yF h/o bipolar disorder p/w swelling and pain to R eyebrow. Patient verbalizes that she has a known disorder of plucking hair. She removed some hairs to R eyebrow and then noticed the swelling. Exam as above. Will obtain labs, will require I&D and abx

## 2024-11-06 NOTE — ED PROCEDURE NOTE - PROCEDURE DATE TIME, MLM
Patient has been on oxygen per nasal cannula while inpatient. Writer called [facility] where patient came from to ask about oxygen use. Spoke with nurse who said \"I am pretty sure I have not seen her walking around with oxygen here, but we have the capabilities to have her on oxygen here if needed\". Writer called JULIUS \"Na\" to confirm oxygen status and POA stated that patient is supposed to be on oxygen chronically.   06-Nov-2024 16:38

## 2024-11-06 NOTE — ED PROVIDER NOTE - NSICDXFAMILYHX_GEN_ALL_CORE_FT
FAMILY HISTORY:  Father  Still living? Unknown  FH: type 2 diabetes, Age at diagnosis: Age Unknown    Mother  Still living? Unknown  FH: bipolar disorder, Age at diagnosis: Age Unknown

## 2024-11-06 NOTE — ED PROCEDURE NOTE - PROCEDURE ADDITIONAL DETAILS
Small amount of purulent drainage was expressed from the Rt eyebrow abscess. Plan of care discussed with patient including PO abx therapy. All questions answered.

## 2024-11-06 NOTE — ED PROVIDER NOTE - PATIENT PORTAL LINK FT
You can access the FollowMyHealth Patient Portal offered by [facility] by registering at the following website: [email or web address]. By joining [employer]’s FollowMyHealth portal, you will also be able to view your health information using other applications (apps) compatible with our system.

## 2024-11-06 NOTE — ED PROVIDER NOTE - PHYSICAL EXAMINATION
Const:  Alert and interactive, no acute distress  HEENT: Normocephalic, atraumatic, multiple areas of hair loss with overlying keratin debris; PERRLA with EOMI; TMs WNL; Moist mucosa w/ + post-nasal drip; no exudates or petechiae in the palate; Neck supple  Lymph: No significant cervical lymphadenopathy  CV: RRR with no audible murmurs; Extremities WWPx4 with cap refill < 2 secs  Pulm: Lungs clear to auscultation bilaterally with no crackles or wheezing; symmetric chest rise  GI: Abdomen non-distended; +BS; No organomegaly, no tenderness, no masses, no peritoneal signs  Skin: discrete area of swelling and erythema above the Rt area, no fluctuance appreciated. Multiple small vesicles in that area.   Neuro: CN II-X intact with no focal deficits, ambulating without difficulty and no gait disturbances; full ROM in all ext with intact sensation in upper and lower ext.

## 2024-11-06 NOTE — ED PROCEDURE NOTE - CPROC ED ANATOMIC LOCATION1
02/23/21 1100   Group Therapy Session   Group Attendance attended group session   Time Session Began 1115   Time Session Ended 1200   Total Time (minutes) 45   Group Type psychotherapeutic   Group Topic Covered coping skills/lifestyle management   Group Session Detail 5 participants   Patient Participation/Contribution cooperative with task   Carroll participated in group and was engaged. He has strong insight and is able to process quite well.    eyebrow

## 2024-11-06 NOTE — ED PROVIDER NOTE - OBJECTIVE STATEMENT
Case of a 29-year-old female with past medical history of bipolar disorder, currently on no medications and NKDA who presents to the ED for swelling over the right eyebrow.  Patient verbalizes that he has a known sick disorder of plucking hair and recently evaluated at  where she was diagnosed with RSV due to ongoing URI symptoms. 24 hours prior to evaluation patient was noticed swelling and redness over the right eyebrow, as well as a bump. She removed some hairs yet in the past couple hours noticed significantly increased in size as well as redness in that area.  Patient endorses tenderness to palpation as well as discomfort with no overall improvement.    Recent history of staph skin infection patient presents to the ED for further evaluation.

## 2024-11-06 NOTE — ED PROVIDER NOTE - CLINICAL SUMMARY MEDICAL DECISION MAKING FREE TEXT BOX
Case of a 29-year-old female with past medical history of bipolar disorder, currently on no medications and NKDA who presents to the ED for swelling over the right eyebrow. At moment of evaluation patient is found hemodynamically stable and in no distress. PE at this time significant for discrete area of swelling and erythema above the Rt area, no fluctuance appreciated, tender to palpation. At this time, most likely dx to consider are skin abscess vs. staphylococcal infection, cellulitis vs.  deep abscess, among others. Will obtain CBC with diff, CMP. Will start pt empirically on doxycyline 100 mg PO due to suspected staph infxn. Will obtain CT orbit w/IV contrast to evaluate for underlying abscess. Due to pt endorsing pain 9/10 and stabbing in nature in that site with no radiation, will administer IV pain medications and will reassess. Patient agrees with tx plan.

## 2024-11-06 NOTE — ED PROVIDER NOTE - PROGRESS NOTE DETAILS
Enzo Amaya MD PGY-7: Pt w/ unremarkable CBC and CMP. Pt pending CT. 1st dose of abx with no reactions. Pt reports improvement in pain. Will monitor for any changes. Enzo Amaya MD PGY-7: Rt eyebrow abscess drained with improvement in pt's sxs. Will switch patient to PO Bactrim and continue therapy PO. All questions answered and strict return precautions given. Wound care and skin care given to the patient and she endorsed understanding. Will discharge home.

## 2024-11-06 NOTE — ED PROVIDER NOTE - NSFOLLOWUPINSTRUCTIONS_ED_ALL_ED_FT
You have had an abscess drained and have been diagnosed with a skin infection. It's important to follow these instructions carefully to promote healing and prevent further complications.    Wound Care:    Keep the wound clean and dry: Gently wash the area around the wound with mild soap and water [Frequency - e.g., twice a day]. Pat the area dry with a clean towel. Do not rub the wound.  Dressings: Change your dressings as directed by your healthcare provider. [Specific Instructions - e.g., Apply a clean, dry gauze dressing after cleaning the wound. If your wound is draining, you may need to change the dressing more frequently.] Wash your hands thoroughly before and after changing the dressing.  Avoid touching the wound: Do not pick at or scratch the wound, as this can introduce bacteria and delay healing.  Observe for signs of infection: Watch for increased redness, swelling, pain, warmth, pus, or red streaks extending from the wound. If you notice any of these signs, contact your healthcare provider immediately.  Showering: You may shower [Specify - e.g., 24 hours after the procedure], but avoid soaking the wound in water (e.g., baths, swimming pools, hot tubs) until it has healed. Pat the wound dry after showering.  Antibiotic Therapy:    Continue taking your prescribed antibiotics: Finish the entire course of antibiotics, even if your symptoms improve. Stopping early can lead to antibiotic resistance and recurrence of the infection. [Specific Instructions - e.g., Take Bactrim 1 tab PO BID for 10 days.  Side effects: If you experience any side effects from the antibiotics, such as nausea, vomiting, diarrhea, or rash, contact your healthcare provider.  Pain Management:    Over-the-counter pain relievers: You may take over-the-counter pain relievers, such as acetaminophen (Tylenol) or ibuprofen (Advil, Motrin), as directed. Follow the dosage instructions on the label and do not exceed the recommended dose.  Prescription pain medication: If you have been prescribed pain medication, take it as directed by your healthcare provider.  Follow-up Care:    Schedule a follow-up appointment: A follow-up appointment is often necessary to monitor your healing and ensure the infection is resolving. Your healthcare provider will let you know when to schedule this appointment.  When to Seek Medical Attention:    Increasing pain: If your pain worsens despite taking pain medication.  Signs of spreading infection: Increased redness, swelling, warmth, pus, red streaks extending from the wound, fever, chills.  Allergic reaction: If you experience any signs of an allergic reaction, such as hives, itching, swelling of the face, lips, or tongue, or difficulty breathing, seek immediate medical attention.  General Recommendations:    Healthy diet: Eat a balanced diet to support your immune system and promote healing.  Hydration: Drink plenty of fluids to stay hydrated.  Rest: Get adequate rest to help your body heal.

## 2024-11-06 NOTE — ED ADULT NURSE NOTE - OBJECTIVE STATEMENT
Pt is a 30 y/o female with pmh staph skin infection presenting to the ED c/o swelling, redness and pain over R eyebrow. Pt reports plucking out hairs near area prior to symptom onset. Pt also reports she is positive for RSV, denies dyspnea.

## 2024-11-08 LAB
-  CLINDAMYCIN: SIGNIFICANT CHANGE UP
-  ERYTHROMYCIN: SIGNIFICANT CHANGE UP
-  GENTAMICIN: SIGNIFICANT CHANGE UP
-  OXACILLIN: SIGNIFICANT CHANGE UP
-  PENICILLIN: SIGNIFICANT CHANGE UP
-  RIFAMPIN: SIGNIFICANT CHANGE UP
-  TETRACYCLINE: SIGNIFICANT CHANGE UP
-  TRIMETHOPRIM/SULFAMETHOXAZOLE: SIGNIFICANT CHANGE UP
-  VANCOMYCIN: SIGNIFICANT CHANGE UP
METHOD TYPE: SIGNIFICANT CHANGE UP

## 2024-11-11 LAB
CULTURE RESULTS: ABNORMAL
ORGANISM # SPEC MICROSCOPIC CNT: ABNORMAL
ORGANISM # SPEC MICROSCOPIC CNT: ABNORMAL
SPECIMEN SOURCE: SIGNIFICANT CHANGE UP

## 2025-01-06 NOTE — ED PROCEDURE NOTE - CPROC ED INFORMED CONSENT1
Benefits, risks, and possible complications of procedure explained to patient/caregiver who verbalized understanding and gave verbal consent. Private car

## 2025-03-11 ENCOUNTER — INPATIENT (INPATIENT)
Facility: HOSPITAL | Age: 30
LOS: 1 days | Discharge: HOME | DRG: 603 | End: 2025-03-13
Attending: STUDENT IN AN ORGANIZED HEALTH CARE EDUCATION/TRAINING PROGRAM | Admitting: STUDENT IN AN ORGANIZED HEALTH CARE EDUCATION/TRAINING PROGRAM
Payer: MEDICAID

## 2025-03-11 VITALS
SYSTOLIC BLOOD PRESSURE: 142 MMHG | HEART RATE: 112 BPM | WEIGHT: 134.92 LBS | DIASTOLIC BLOOD PRESSURE: 87 MMHG | OXYGEN SATURATION: 99 % | RESPIRATION RATE: 20 BRPM | HEIGHT: 64 IN | TEMPERATURE: 98 F

## 2025-03-11 LAB
ALBUMIN SERPL ELPH-MCNC: 4.7 G/DL — SIGNIFICANT CHANGE UP (ref 3.3–5)
ALP SERPL-CCNC: 60 U/L — SIGNIFICANT CHANGE UP (ref 40–120)
ALT FLD-CCNC: 26 U/L — SIGNIFICANT CHANGE UP (ref 10–45)
ANION GAP SERPL CALC-SCNC: 15 MMOL/L — SIGNIFICANT CHANGE UP (ref 5–17)
AST SERPL-CCNC: 22 U/L — SIGNIFICANT CHANGE UP (ref 10–40)
BASOPHILS # BLD AUTO: 0.02 K/UL — SIGNIFICANT CHANGE UP (ref 0–0.2)
BASOPHILS NFR BLD AUTO: 0.4 % — SIGNIFICANT CHANGE UP (ref 0–2)
BILIRUB SERPL-MCNC: 0.4 MG/DL — SIGNIFICANT CHANGE UP (ref 0.2–1.2)
BUN SERPL-MCNC: 18 MG/DL — SIGNIFICANT CHANGE UP (ref 7–23)
CALCIUM SERPL-MCNC: 9.4 MG/DL — SIGNIFICANT CHANGE UP (ref 8.4–10.5)
CHLORIDE SERPL-SCNC: 102 MMOL/L — SIGNIFICANT CHANGE UP (ref 96–108)
CO2 SERPL-SCNC: 20 MMOL/L — LOW (ref 22–31)
CREAT SERPL-MCNC: 0.7 MG/DL — SIGNIFICANT CHANGE UP (ref 0.5–1.3)
CRP SERPL-MCNC: 18 MG/L — HIGH (ref 0–4)
EGFR: 119 ML/MIN/1.73M2 — SIGNIFICANT CHANGE UP
EGFR: 119 ML/MIN/1.73M2 — SIGNIFICANT CHANGE UP
EOSINOPHIL # BLD AUTO: 0.06 K/UL — SIGNIFICANT CHANGE UP (ref 0–0.5)
EOSINOPHIL NFR BLD AUTO: 1.1 % — SIGNIFICANT CHANGE UP (ref 0–6)
GLUCOSE SERPL-MCNC: 122 MG/DL — HIGH (ref 70–99)
HCG UR QL: NEGATIVE — SIGNIFICANT CHANGE UP
HCT VFR BLD CALC: 35.8 % — SIGNIFICANT CHANGE UP (ref 34.5–45)
HGB BLD-MCNC: 11.8 G/DL — SIGNIFICANT CHANGE UP (ref 11.5–15.5)
HIV 1 & 2 AB SERPL IA.RAPID: SIGNIFICANT CHANGE UP
IMM GRANULOCYTES NFR BLD AUTO: 0.4 % — SIGNIFICANT CHANGE UP (ref 0–0.9)
LYMPHOCYTES # BLD AUTO: 0.84 K/UL — LOW (ref 1–3.3)
LYMPHOCYTES # BLD AUTO: 14.8 % — SIGNIFICANT CHANGE UP (ref 13–44)
MAGNESIUM SERPL-MCNC: 1.9 MG/DL — SIGNIFICANT CHANGE UP (ref 1.6–2.6)
MCHC RBC-ENTMCNC: 30.2 PG — SIGNIFICANT CHANGE UP (ref 27–34)
MCHC RBC-ENTMCNC: 33 G/DL — SIGNIFICANT CHANGE UP (ref 32–36)
MCV RBC AUTO: 91.6 FL — SIGNIFICANT CHANGE UP (ref 80–100)
MONOCYTES # BLD AUTO: 0.34 K/UL — SIGNIFICANT CHANGE UP (ref 0–0.9)
MONOCYTES NFR BLD AUTO: 6 % — SIGNIFICANT CHANGE UP (ref 2–14)
NEUTROPHILS # BLD AUTO: 4.4 K/UL — SIGNIFICANT CHANGE UP (ref 1.8–7.4)
NEUTROPHILS NFR BLD AUTO: 77.3 % — HIGH (ref 43–77)
NRBC BLD AUTO-RTO: 0 /100 WBCS — SIGNIFICANT CHANGE UP (ref 0–0)
PLATELET # BLD AUTO: 185 K/UL — SIGNIFICANT CHANGE UP (ref 150–400)
POTASSIUM SERPL-MCNC: 4.4 MMOL/L — SIGNIFICANT CHANGE UP (ref 3.5–5.3)
POTASSIUM SERPL-SCNC: 4.4 MMOL/L — SIGNIFICANT CHANGE UP (ref 3.5–5.3)
PROCALCITONIN SERPL-MCNC: 0.03 NG/ML — SIGNIFICANT CHANGE UP (ref 0.02–0.1)
PROT SERPL-MCNC: 7.3 G/DL — SIGNIFICANT CHANGE UP (ref 6–8.3)
RBC # BLD: 3.91 M/UL — SIGNIFICANT CHANGE UP (ref 3.8–5.2)
RBC # FLD: 13.8 % — SIGNIFICANT CHANGE UP (ref 10.3–14.5)
SODIUM SERPL-SCNC: 137 MMOL/L — SIGNIFICANT CHANGE UP (ref 135–145)
WBC # BLD: 5.68 K/UL — SIGNIFICANT CHANGE UP (ref 3.8–10.5)
WBC # FLD AUTO: 5.68 K/UL — SIGNIFICANT CHANGE UP (ref 3.8–10.5)

## 2025-03-11 PROCEDURE — 70487 CT MAXILLOFACIAL W/DYE: CPT | Mod: 26

## 2025-03-11 PROCEDURE — 99223 1ST HOSP IP/OBS HIGH 75: CPT

## 2025-03-11 RX ORDER — AMPICILLIN SODIUM AND SULBACTAM SODIUM 1; .5 G/1; G/1
3 INJECTION, POWDER, FOR SOLUTION INTRAMUSCULAR; INTRAVENOUS ONCE
Refills: 0 | Status: COMPLETED | OUTPATIENT
Start: 2025-03-11 | End: 2025-03-11

## 2025-03-11 RX ORDER — DOXYCYCLINE HYCLATE 100 MG
100 TABLET ORAL EVERY 12 HOURS
Refills: 0 | Status: DISCONTINUED | OUTPATIENT
Start: 2025-03-12 | End: 2025-03-12

## 2025-03-11 RX ORDER — MUPIROCIN CALCIUM 20 MG/G
1 CREAM TOPICAL ONCE
Refills: 0 | Status: COMPLETED | OUTPATIENT
Start: 2025-03-11 | End: 2025-03-11

## 2025-03-11 RX ORDER — DOXYCYCLINE HYCLATE 100 MG
100 TABLET ORAL ONCE
Refills: 0 | Status: COMPLETED | OUTPATIENT
Start: 2025-03-11 | End: 2025-03-11

## 2025-03-11 RX ORDER — AMPICILLIN SODIUM AND SULBACTAM SODIUM 1; .5 G/1; G/1
3 INJECTION, POWDER, FOR SOLUTION INTRAMUSCULAR; INTRAVENOUS EVERY 6 HOURS
Refills: 0 | Status: DISCONTINUED | OUTPATIENT
Start: 2025-03-12 | End: 2025-03-12

## 2025-03-11 RX ORDER — AMPICILLIN SODIUM AND SULBACTAM SODIUM 1; .5 G/1; G/1
INJECTION, POWDER, FOR SOLUTION INTRAMUSCULAR; INTRAVENOUS
Refills: 0 | Status: DISCONTINUED | OUTPATIENT
Start: 2025-03-11 | End: 2025-03-12

## 2025-03-11 RX ORDER — KETOROLAC TROMETHAMINE 30 MG/ML
15 INJECTION, SOLUTION INTRAMUSCULAR; INTRAVENOUS ONCE
Refills: 0 | Status: DISCONTINUED | OUTPATIENT
Start: 2025-03-11 | End: 2025-03-11

## 2025-03-11 RX ORDER — DOXYCYCLINE HYCLATE 100 MG
TABLET ORAL
Refills: 0 | Status: DISCONTINUED | OUTPATIENT
Start: 2025-03-11 | End: 2025-03-12

## 2025-03-11 RX ADMIN — KETOROLAC TROMETHAMINE 15 MILLIGRAM(S): 30 INJECTION, SOLUTION INTRAMUSCULAR; INTRAVENOUS at 18:03

## 2025-03-11 RX ADMIN — MUPIROCIN CALCIUM 1 APPLICATION(S): 20 CREAM TOPICAL at 15:15

## 2025-03-11 RX ADMIN — Medication 100 MILLIGRAM(S): at 15:15

## 2025-03-11 RX ADMIN — AMPICILLIN SODIUM AND SULBACTAM SODIUM 200 GRAM(S): 1; .5 INJECTION, POWDER, FOR SOLUTION INTRAMUSCULAR; INTRAVENOUS at 23:19

## 2025-03-11 RX ADMIN — AMPICILLIN SODIUM AND SULBACTAM SODIUM 200 GRAM(S): 1; .5 INJECTION, POWDER, FOR SOLUTION INTRAMUSCULAR; INTRAVENOUS at 16:34

## 2025-03-11 NOTE — ED ADULT NURSE NOTE - NSFALLUNIVINTERV_ED_ALL_ED
Bed/Stretcher in lowest position, wheels locked, appropriate side rails in place/Call bell, personal items and telephone in reach/Instruct patient to call for assistance before getting out of bed/chair/stretcher/Non-slip footwear applied when patient is off stretcher/[place] to call system/Physically safe environment - no spills, clutter or unnecessary equipment/Purposeful proactive rounding/Room/bathroom lighting operational, light cord in reach

## 2025-03-11 NOTE — CONSULT NOTE ADULT - NS ATTEND AMEND GEN_ALL_CORE FT
29 f with multiple skin abscess due to MSSA (cheek, eye brow), has cystic lesions on scalp and face that follows with derm (?acne) was on keflex and then was given bactim which she is still taking and she picked on the the L forehead cyst then developed erythema around her L eye, no fever, eye pain or change in the vision  afebrile, no WBC    periorbital cellulitis after she manipulated a forehead Cyst/acne, no fever or WBC and non toxic    * all previous cxs were MSSA so switch to cefazolin 1 q 8  * follow the facial CT    The above assessment and plan was discussed with the primary team    Karrie Candelario MD  contact on teams  After 5pm and on weekends call [phone]

## 2025-03-11 NOTE — ED PROVIDER NOTE - CARE PLAN
Principal Discharge DX:	Facial cellulitis  Secondary Diagnosis:	Carbuncle and furuncle  Secondary Diagnosis:	Periorbital cellulitis of left eye   1

## 2025-03-11 NOTE — ED ADULT NURSE NOTE - OBJECTIVE STATEMENT
30 y/o female arrives to the ER complaining of eye swelling. Pt reports  having left eyelid redness and swelling x 2days, started on sulfamethoxazole 2x daily on the 6th was told to come in by her dermatologist (MD Hancock)  Denies fevers, vision changes, denies SOB, chest pain, dizziness,  fevers, chills.  On assessment pt is well appearing, A&Ox4, speaking coherently, airway is patent, breathing spontaneously and unlabored. Skin is dry, warm. L eyelid swelling, redness noted.

## 2025-03-11 NOTE — ED CDU PROVIDER INITIAL DAY NOTE - OBJECTIVE STATEMENT
29 y.o. female history of bipolar disorder (not on medications), frequent skin infections (MRSA in the past) presenting to he ED complaining of worsening redness and swelling to the face despite being on Bacrtim and keflex for the past 5 days by her dermatologist.  No fevers no chills.  No other complaints at this time.  No visual changes, no pain with eye movement.

## 2025-03-11 NOTE — ED PROVIDER NOTE - PHYSICAL EXAMINATION
GEN: Pt in NAD  PSYCH: Affect appropriate.  EYES: L periorbital erythema and edema, edema of L superior lid. Sclera white w/o injection. PERRL, EOMI w/o pain. VA 20/20 OD/OS/OU.  ENT: Head NCAT. Neck supple FROM.  RESP: CTA b/l, no wheezes, rales, or rhonchi.   CARDIAC: RRR, clear distinct S1, S2, no appreciable murmurs.  ABD: Abdomen soft, non-tender.   VASC: No edema or calf tenderness  SKIN: Excoriations with surrounding erythema in left frontal region and right mandibular region.  Additional excoriation without surrounding erythema of right clavicular region and neck over the region of vertebral prominence.

## 2025-03-11 NOTE — ED CDU PROVIDER INITIAL DAY NOTE - PROGRESS NOTE DETAILS
CDU PROGRESS NOTE PA TIP: Received pt at 1900 sign-out. Case/plan reviewed. CT resulted, Skin thickening and subcutaneous edema involving the bilateral frontal scalp suggestive of cellulitis. No evidence of post septal cellulitis. Hypodense focus within the right cheek area of skin thickening may   represent a small abscess collection versus draining sinus. On exam +Excoriations with surrounding erythema in left frontal region and right mandibular region. Additional excoriation without surrounding erythema of right clavicular region and neck over the region of vertebral prominence. Pt accepted to CDU for frequent reeval, vitals q 4hrs, iv abx, pain control.

## 2025-03-11 NOTE — ED PROVIDER NOTE - ATTENDING APP SHARED VISIT CONTRIBUTION OF CARE
------------ATTENDING NOTE------------   pt c/o increasing redness, swelling, tenderness to L face around eye, no eye pain/visual changes, VF/VA w wnl and EOMI w/o pain/diplopia, complicated as cellulitis / carbuncles / folliculitis to face / scalp, complicated as recent Keflex then Bactrim, plan for CDU for labs / imaging / ID consult / targeted antibiotics.  - Nba Greene MD   --------------------------------------------------

## 2025-03-11 NOTE — CONSULT NOTE ADULT - ASSESSMENT
29-year-old female PMH of bipolar disorder not on medication currently presents to the ED complaining of left periorbital redness and swelling for several days referred to ED by dermatology.  Patient reports she "picked a cyst" on her left forehead and subsequently developed surrounding erythema was treated by an outpatient dermatologist with sulfamethoxazole which she started taking on 3/6/2025.  Patient reports she has several other areas of excoriation and notes potential psychogenic picking due to bipolar disorder, reports similar symptoms in the past.  However newly developed left periorbital erythema and edema over the past 2 days.  Patient denies pain with EOMs, fevers, chills, visual change, n/v. ID consulted for abx management.    Afebrile  WBC wnl   Imaging: Not done as yet  Denies pain with eye movement  MSSA prior cx  #Left Eye erythema    Plan    Overall pt with fabby orbital edema and swelling no vision changes per patient and no pain on eye movement.  Can d/c doxy and and unasyn  Can start cefazolin 1gram q 8h  F/u imaging  Plan d/w Dr. Candelario and floor provider

## 2025-03-11 NOTE — CONSULT NOTE ADULT - SUBJECTIVE AND OBJECTIVE BOX
Patient is a 29y old  Female who presents with a chief complaint of   HPI:     REVIEW OF SYSTEMS  [  ] ROS unobtainable because:    [ x ] All other systems negative except as noted below  Constitutional:  [ ] fever [ ] chills  [ ] weight loss  [ ]night sweat  [ ]poor appetite/PO intake [ ]fatigue   Skin:  [ ] rash [ ] phlebitis	  Eyes: [ ] icterus [ ] pain  [ ] discharge	  ENMT: [ ] sore throat  [ ] thrush [ ] ulcers [ ] exudates [ ]anosmia  Respiratory: [ ] dyspnea [ ] hemoptysis [ ] cough [ ] sputum	  Cardiovascular:  [ ] chest pain [ ] palpitations [ ] edema	  Gastrointestinal:  [ ] nausea [ ] vomiting [ ] diarrhea [ ] constipation [ ] pain	  Genitourinary:  [ ] dysuria [ ] frequency [ ] hematuria [ ] discharge [ ] flank pain  [ ] incontinence  Musculoskeletal:  [ ] myalgias [ ] arthralgias [ ] arthritis  [ ] back pain  Neurological:  [ ] headache [ ] weakness [ ] seizures  [ ] confusion/altered mental status  prior hospital charts reviewed [V]  primary team notes reviewed [V]  other consultant notes reviewed [V]    PAST MEDICAL & SURGICAL HISTORY:  Bipolar disorder      Anemia          SOCIAL HISTORY:  - Denied smoking/vaping/alcohol/recreational drug use    FAMILY HISTORY:  FH: bipolar disorder (Mother)    FH: type 2 diabetes (Father)        Allergies  No Known Drug Allergies  Seafood (Vomiting; Nausea)        ANTIMICROBIALS:  ampicillin/sulbactam  IVPB    ampicillin/sulbactam  IVPB 3 once  doxycycline IVPB        ANTIMICROBIALS (past 90 days):  MEDICATIONS  (STANDING):    doxycycline IVPB   100 mL/Hr IV Intermittent (03-11-25 @ 15:15)        OTHER MEDS:   MEDICATIONS  (STANDING):      VITALS:  Vital Signs Last 24 Hrs  T(F): 98.1 (03-11-25 @ 14:03), Max: 98.1 (03-11-25 @ 14:03)    Vital Signs Last 24 Hrs  HR: 112 (03-11-25 @ 14:03) (112 - 112)  BP: 142/87 (03-11-25 @ 14:03) (142/87 - 142/87)  RR: 20 (03-11-25 @ 14:03)  SpO2: 99% (03-11-25 @ 14:03) (99% - 99%)  Wt(kg): --    EXAM:    GA: NAD, AOx3  HEENT: oral cavity no lesion  CV: nl S1/S2, no RMG  Lungs: CTAB, No distress  Abd: BS+, soft, nontender, no rebounding pain  Ext: no edema  Neuro: No focal deficits  Skin: left eye periorbital edema  IV: no phlebitis  Labs:                        11.8   5.68  )-----------( 185      ( 11 Mar 2025 14:43 )             35.8     03-11    137  |  102  |  18  ----------------------------<  122[H]  4.4   |  20[L]  |  0.70    Ca    9.4      11 Mar 2025 14:44  Mg     1.9     03-11    TPro  7.3  /  Alb  4.7  /  TBili  0.4  /  DBili  x   /  AST  22  /  ALT  26  /  AlkPhos  60  03-11    WBC Trend:  WBC Count: 5.68 (03-11-25 @ 14:43)    Auto Neutrophil #: 7.69 K/uL (11-06-24 @ 13:24)  Auto Neutrophil #: 3.03 K/uL (07-23-24 @ 07:18)  Auto Neutrophil #: 3.58 K/uL (07-22-24 @ 07:34)  Auto Neutrophil #: 2.50 K/uL (07-21-24 @ 07:23)  Auto Neutrophil #: 3.22 K/uL (07-20-24 @ 06:58)    Creatine Trend:  Creatinine: 0.70 (03-11)    Liver Biochemical Testing Trend:  Alanine Aminotransferase (ALT/SGPT): 26 (03-11)  Alanine Aminotransferase (ALT/SGPT): 10 (11-06)  Alanine Aminotransferase (ALT/SGPT): 17 (07-23)  Alanine Aminotransferase (ALT/SGPT): 20 (07-22)  Alanine Aminotransferase (ALT/SGPT): 20 (07-21)  Aspartate Aminotransferase (AST/SGOT): 22 (03-11-25 @ 14:44)  Aspartate Aminotransferase (AST/SGOT): 16 (11-06-24 @ 13:24)  Aspartate Aminotransferase (AST/SGOT): 15 (07-23-24 @ 07:18)  Aspartate Aminotransferase (AST/SGOT): 15 (07-22-24 @ 07:46)  Aspartate Aminotransferase (AST/SGOT): 15 (07-21-24 @ 07:23)  Bilirubin Total: 0.4 (03-11)  Bilirubin Total: 0.2 (11-06)  Bilirubin Total: 0.2 (07-23)  Bilirubin Total: 0.1 (07-22)  Bilirubin Total: <0.1 (07-21)    Trend LDH      Urinalysis Basic - ( 11 Mar 2025 14:44 )    Color: x / Appearance: x / SG: x / pH: x  Gluc: 122 mg/dL / Ketone: x  / Bili: x / Urobili: x   Blood: x / Protein: x / Nitrite: x   Leuk Esterase: x / RBC: x / WBC x   Sq Epi: x / Non Sq Epi: x / Bacteria: x      MICROBIOLOGY:    MRSA PCR Result.: Car (07-19-24 @ 01:02)      Culture - Abscess with Gram Stain (collected 19 Jul 2024 17:19)  Source: .Abscess facial abscess #2  Final Report:    Moderate Staphylococcus aureus  Organism: Staphylococcus aureus  Organism: Staphylococcus aureus    Sensitivities:      Method Type: SINGH      -  Clindamycin: S <=0.25      -  Erythromycin: R >4      -  Gentamicin: S <=1 Should not be used as monotherapy      -  Oxacillin: S 0.5 Oxacillin predicts susceptibility for dicloxacillin, methicillin, and nafcillin      -  Penicillin: R >8      -  Rifampin: S <=1 Should not be used as monotherapy      -  Tetracycline: S <=1      -  Trimethoprim/Sulfamethoxazole: S <=0.5/9.5      -  Vancomycin: S 1    Culture - Abscess with Gram Stain (collected 19 Jul 2024 04:12)  Source: .Abscess right cheek  Final Report:    Moderate Staphylococcus aureus  Organism: Staphylococcus aureus  Organism: Staphylococcus aureus    Sensitivities:      Method Type: SINGH      -  Clindamycin: S <=0.25      -  Erythromycin: R >4      -  Gentamicin: S <=1 Should not be used as monotherapy      -  Oxacillin: S 1 Oxacillin predicts susceptibility for dicloxacillin, methicillin, and nafcillin      -  Penicillin: R >8      -  Rifampin: S <=1 Should not be used as monotherapy      -  Tetracycline: S <=1      -  Trimethoprim/Sulfamethoxazole: S <=0.5/9.5      -  Vancomycin: S 1    Culture - Blood (collected 19 Jul 2024 00:45)  Source: .Blood Blood-Peripheral  Final Report:    No growth at 5 days    Culture - Blood (collected 19 Jul 2024 00:30)  Source: .Blood Blood-Peripheral  Final Report:    No growth at 5 days      HIV-1/2 Combo Result: Nonreact (07-21-24 @ 07:23)      Procalcitonin: 0.03 (03-11)    C-Reactive Protein: 18 (03-11)      CSF:      RADIOLOGY:   29 f with multiple skin abscess due to MSSA (cheek, eye brow), has cystic lesions on scalp and face that follows with derm (?acne) was on keflex and then was given bactim which she is still taking and she picked on the the L forehead cyst then developed erythema around her L eye, no fever, eye pain or change in the vision     REVIEW OF SYSTEMS  [  ] ROS unobtainable because:    [ x ] All other systems negative except as noted below  Constitutional:  [ ] fever [ ] chills  [ ] weight loss  [ ]night sweat  [ ]poor appetite/PO intake [ ]fatigue   Skin:  [ ] rash [ ] phlebitis	  Eyes: [ ] icterus [ ] pain  [ ] discharge	  ENMT: [ ] sore throat  [ ] thrush [ ] ulcers [ ] exudates [ ]anosmia  Respiratory: [ ] dyspnea [ ] hemoptysis [ ] cough [ ] sputum	  Cardiovascular:  [ ] chest pain [ ] palpitations [ ] edema	  Gastrointestinal:  [ ] nausea [ ] vomiting [ ] diarrhea [ ] constipation [ ] pain	  Genitourinary:  [ ] dysuria [ ] frequency [ ] hematuria [ ] discharge [ ] flank pain  [ ] incontinence  Musculoskeletal:  [ ] myalgias [ ] arthralgias [ ] arthritis  [ ] back pain  Neurological:  [ ] headache [ ] weakness [ ] seizures  [ ] confusion/altered mental status  prior hospital charts reviewed [V]  primary team notes reviewed [V]  other consultant notes reviewed [V]    PAST MEDICAL & SURGICAL HISTORY:  Bipolar disorder      Anemia          SOCIAL HISTORY:  lives with her parents, has dogs at home  smokes cigarettes and marijuana  no alcohol or drug abuse    FAMILY HISTORY:  FH: bipolar disorder (Mother)    FH: type 2 diabetes (Father)        Allergies  No Known Drug Allergies  Seafood (Vomiting; Nausea)        ANTIMICROBIALS:  ampicillin/sulbactam  IVPB    ampicillin/sulbactam  IVPB 3 once  doxycycline IVPB        ANTIMICROBIALS (past 90 days):  MEDICATIONS  (STANDING):    doxycycline IVPB   100 mL/Hr IV Intermittent (03-11-25 @ 15:15)        OTHER MEDS:   MEDICATIONS  (STANDING):      VITALS:  Vital Signs Last 24 Hrs  T(F): 98.1 (03-11-25 @ 14:03), Max: 98.1 (03-11-25 @ 14:03)    Vital Signs Last 24 Hrs  HR: 112 (03-11-25 @ 14:03) (112 - 112)  BP: 142/87 (03-11-25 @ 14:03) (142/87 - 142/87)  RR: 20 (03-11-25 @ 14:03)  SpO2: 99% (03-11-25 @ 14:03) (99% - 99%)  Wt(kg): --    EXAM:    GA: NAD, AOx3  eyes: L periorbital edema and erythema  ENT: oral cavity no lesion  CV: nl S1/S2, no RMG  Lungs: CTAB, No distress  Abd: BS+, soft, nontender, no rebounding pain  Ext: no edema  Neuro: No focal deficits  Skin: cystic lesion on scalp, erythematous open one on L forehead  IV: no phlebitis  Labs:                        11.8   5.68  )-----------( 185      ( 11 Mar 2025 14:43 )             35.8     03-11    137  |  102  |  18  ----------------------------<  122[H]  4.4   |  20[L]  |  0.70    Ca    9.4      11 Mar 2025 14:44  Mg     1.9     03-11    TPro  7.3  /  Alb  4.7  /  TBili  0.4  /  DBili  x   /  AST  22  /  ALT  26  /  AlkPhos  60  03-11    WBC Trend:  WBC Count: 5.68 (03-11-25 @ 14:43)    Auto Neutrophil #: 7.69 K/uL (11-06-24 @ 13:24)  Auto Neutrophil #: 3.03 K/uL (07-23-24 @ 07:18)  Auto Neutrophil #: 3.58 K/uL (07-22-24 @ 07:34)  Auto Neutrophil #: 2.50 K/uL (07-21-24 @ 07:23)  Auto Neutrophil #: 3.22 K/uL (07-20-24 @ 06:58)    Creatine Trend:  Creatinine: 0.70 (03-11)    Liver Biochemical Testing Trend:  Alanine Aminotransferase (ALT/SGPT): 26 (03-11)  Alanine Aminotransferase (ALT/SGPT): 10 (11-06)  Alanine Aminotransferase (ALT/SGPT): 17 (07-23)  Alanine Aminotransferase (ALT/SGPT): 20 (07-22)  Alanine Aminotransferase (ALT/SGPT): 20 (07-21)  Aspartate Aminotransferase (AST/SGOT): 22 (03-11-25 @ 14:44)  Aspartate Aminotransferase (AST/SGOT): 16 (11-06-24 @ 13:24)  Aspartate Aminotransferase (AST/SGOT): 15 (07-23-24 @ 07:18)  Aspartate Aminotransferase (AST/SGOT): 15 (07-22-24 @ 07:46)  Aspartate Aminotransferase (AST/SGOT): 15 (07-21-24 @ 07:23)  Bilirubin Total: 0.4 (03-11)  Bilirubin Total: 0.2 (11-06)  Bilirubin Total: 0.2 (07-23)  Bilirubin Total: 0.1 (07-22)  Bilirubin Total: <0.1 (07-21)    Trend LDH      Urinalysis Basic - ( 11 Mar 2025 14:44 )    Color: x / Appearance: x / SG: x / pH: x  Gluc: 122 mg/dL / Ketone: x  / Bili: x / Urobili: x   Blood: x / Protein: x / Nitrite: x   Leuk Esterase: x / RBC: x / WBC x   Sq Epi: x / Non Sq Epi: x / Bacteria: x      MICROBIOLOGY:    MRSA PCR Result.: Car (07-19-24 @ 01:02)      Culture - Abscess with Gram Stain (collected 19 Jul 2024 17:19)  Source: .Abscess facial abscess #2  Final Report:    Moderate Staphylococcus aureus  Organism: Staphylococcus aureus  Organism: Staphylococcus aureus    Sensitivities:      Method Type: SINGH      -  Clindamycin: S <=0.25      -  Erythromycin: R >4      -  Gentamicin: S <=1 Should not be used as monotherapy      -  Oxacillin: S 0.5 Oxacillin predicts susceptibility for dicloxacillin, methicillin, and nafcillin      -  Penicillin: R >8      -  Rifampin: S <=1 Should not be used as monotherapy      -  Tetracycline: S <=1      -  Trimethoprim/Sulfamethoxazole: S <=0.5/9.5      -  Vancomycin: S 1    Culture - Abscess with Gram Stain (collected 19 Jul 2024 04:12)  Source: .Abscess right cheek  Final Report:    Moderate Staphylococcus aureus  Organism: Staphylococcus aureus  Organism: Staphylococcus aureus    Sensitivities:      Method Type: SINGH      -  Clindamycin: S <=0.25      -  Erythromycin: R >4      -  Gentamicin: S <=1 Should not be used as monotherapy      -  Oxacillin: S 1 Oxacillin predicts susceptibility for dicloxacillin, methicillin, and nafcillin      -  Penicillin: R >8      -  Rifampin: S <=1 Should not be used as monotherapy      -  Tetracycline: S <=1      -  Trimethoprim/Sulfamethoxazole: S <=0.5/9.5      -  Vancomycin: S 1    Culture - Blood (collected 19 Jul 2024 00:45)  Source: .Blood Blood-Peripheral  Final Report:    No growth at 5 days    Culture - Blood (collected 19 Jul 2024 00:30)  Source: .Blood Blood-Peripheral  Final Report:    No growth at 5 days      HIV-1/2 Combo Result: Nonreact (07-21-24 @ 07:23)      Procalcitonin: 0.03 (03-11)    C-Reactive Protein: 18 (03-11)      CSF:

## 2025-03-11 NOTE — ED CDU PROVIDER DISPOSITION NOTE - ATTENDING APP SHARED VISIT CONTRIBUTION OF CARE
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  Patient has no fevers, chills, visual changes or pain with eye movement.    In the ED, labs with no leukocytosis and normal ESR and procalcitonin, and CRP mildly elevated at 18.  CT max face with IV contrast showed findings suggestive of cellulitis and hypodense focus in the right cheek which may represent small abscess/collection/draining sinus.  ED deemed that patient did not require incision and drainage.    Patient was placed in the CDU for further monitoring, frequent reassessments, Q4 hour vital signs, IV antibiotics, ID consultation.    Patient was seen by ID who recommends to discontinue Doxy and Unasyn at the start cefazolin.    Patient was evaluated by me in the morning, and reports persistent symptoms.  On examination, patient with elevated blood pressure but otherwise stable vitals, well-appearing, in no acute distress.  EYE: (+) Left periorbital swelling and erythema and tenderness with warmth, but PERRLA, EOMI without diplopia or discomfort, visual fields intact.  Examination of face with induration to the right cheek but no fluctuance or tenderness.  Cardiac examination RRR, lungs CTAB.  Abdomen is soft and nontender.  Neurovascularly intact in all 4 extremities.  Cranial nerves III-XII intact, no pronator drift, normal speech and gait.    The patient will need to be admitted to the hospital for continued evaluation and management.  Discussed with the accepting physician regarding the initial presentation, diagnostic studies, treatments given in the ED, and current plan of care.  The patient was accepted by and endorsed to the medicine team with infectious disease consultation.

## 2025-03-11 NOTE — ED ADULT TRIAGE NOTE - CHIEF COMPLAINT QUOTE
left eyelid redness and swelling x 2days, started on sulfamethoxazole 2x daily on the 6th was told to come in by her dermatologist (MD Hancock)   Denies fevers, vision changes

## 2025-03-11 NOTE — ED PROVIDER NOTE - OBJECTIVE STATEMENT
29-year-old female PMH of bipolar disorder not on medication currently presents to the ED complaining of left periorbital redness and swelling for several days referred to ED by dermatology.  Patient reports she "picked a cyst" on her left forehead and subsequently developed surrounding erythema was treated by an outpatient dermatologist with sulfamethoxazole which she started taking on 3/6/2025.  Patient reports she has several other areas of excoriation and notes potential psychogenic picking due to bipolar disorder, reports similar symptoms in the past.  However newly developed left periorbital erythema and edema over the past 2 days.  Patient denies pain with EOMs, fevers, chills, visual change, n/v. 29-year-old female PMH of bipolar disorder not on medication currently presents to the ED complaining of left periorbital redness and swelling for several days referred to ED by dermatology.  Patient reports she "picked a cyst" on her left forehead and subsequently developed surrounding erythema was treated by an outpatient dermatologist with cephalexin + sulfamethoxazole which she started taking on 3/6/2025.  Patient reports she has several other areas of excoriation and notes potential psychogenic picking due to bipolar disorder, reports similar symptoms in the past.  However newly developed left periorbital erythema and edema over the past 2 days.  Patient denies pain with EOMs, fevers, chills, visual change, n/v.

## 2025-03-11 NOTE — ED PROVIDER NOTE - PROGRESS NOTE DETAILS
Discussed with clerk Alexis who will place call to infectious disease for consultation. -Johnnie Gonzalez PA-C d/w Dr. Johnnie Hancock (outpatient dermatologist) who reports pt was placed on TMP/SMX for cysts, presented to clinic today for cyst extraction/reeval and had developed periorbital erythema and erythema around newly excoriated regions on face and was referred to ED. They report they did not Rx cephalexin to the pt. d/w pt who states she took TMP/SMX and cephalexin outpatient. -Johnnie Gonzalez PA-C

## 2025-03-11 NOTE — ED CDU PROVIDER INITIAL DAY NOTE - SKIN, MLM
multiple small abscesses over the face and scalp.  + erythema and edema to left lower lid that is soft and not warm

## 2025-03-12 DIAGNOSIS — L03.211 CELLULITIS OF FACE: ICD-10-CM

## 2025-03-12 DIAGNOSIS — Z87.898 PERSONAL HISTORY OF OTHER SPECIFIED CONDITIONS: ICD-10-CM

## 2025-03-12 DIAGNOSIS — Z29.9 ENCOUNTER FOR PROPHYLACTIC MEASURES, UNSPECIFIED: ICD-10-CM

## 2025-03-12 LAB
A1C WITH ESTIMATED AVERAGE GLUCOSE RESULT: 5.2 % — SIGNIFICANT CHANGE UP (ref 4–5.6)
BASOPHILS # BLD AUTO: 0.04 K/UL — SIGNIFICANT CHANGE UP (ref 0–0.2)
BASOPHILS NFR BLD AUTO: 1 % — SIGNIFICANT CHANGE UP (ref 0–2)
EOSINOPHIL # BLD AUTO: 0.11 K/UL — SIGNIFICANT CHANGE UP (ref 0–0.5)
EOSINOPHIL NFR BLD AUTO: 2.7 % — SIGNIFICANT CHANGE UP (ref 0–6)
ERYTHROCYTE [SEDIMENTATION RATE] IN BLOOD: 3 MM/HR — SIGNIFICANT CHANGE UP (ref 0–15)
ESTIMATED AVERAGE GLUCOSE: 103 MG/DL — SIGNIFICANT CHANGE UP (ref 68–114)
HCT VFR BLD CALC: 35.2 % — SIGNIFICANT CHANGE UP (ref 34.5–45)
HGB BLD-MCNC: 11.3 G/DL — LOW (ref 11.5–15.5)
IMM GRANULOCYTES NFR BLD AUTO: 0.2 % — SIGNIFICANT CHANGE UP (ref 0–0.9)
LYMPHOCYTES # BLD AUTO: 1.22 K/UL — SIGNIFICANT CHANGE UP (ref 1–3.3)
LYMPHOCYTES # BLD AUTO: 29.7 % — SIGNIFICANT CHANGE UP (ref 13–44)
MCHC RBC-ENTMCNC: 29.4 PG — SIGNIFICANT CHANGE UP (ref 27–34)
MCHC RBC-ENTMCNC: 32.1 G/DL — SIGNIFICANT CHANGE UP (ref 32–36)
MCV RBC AUTO: 91.4 FL — SIGNIFICANT CHANGE UP (ref 80–100)
MONOCYTES # BLD AUTO: 0.49 K/UL — SIGNIFICANT CHANGE UP (ref 0–0.9)
MONOCYTES NFR BLD AUTO: 11.9 % — SIGNIFICANT CHANGE UP (ref 2–14)
MRSA PCR RESULT.: SIGNIFICANT CHANGE UP
NEUTROPHILS # BLD AUTO: 2.24 K/UL — SIGNIFICANT CHANGE UP (ref 1.8–7.4)
NEUTROPHILS NFR BLD AUTO: 54.5 % — SIGNIFICANT CHANGE UP (ref 43–77)
NRBC BLD AUTO-RTO: 0 /100 WBCS — SIGNIFICANT CHANGE UP (ref 0–0)
PLATELET # BLD AUTO: 194 K/UL — SIGNIFICANT CHANGE UP (ref 150–400)
RBC # BLD: 3.85 M/UL — SIGNIFICANT CHANGE UP (ref 3.8–5.2)
RBC # FLD: 13.8 % — SIGNIFICANT CHANGE UP (ref 10.3–14.5)
S AUREUS DNA NOSE QL NAA+PROBE: SIGNIFICANT CHANGE UP
WBC # BLD: 4.11 K/UL — SIGNIFICANT CHANGE UP (ref 3.8–10.5)
WBC # FLD AUTO: 4.11 K/UL — SIGNIFICANT CHANGE UP (ref 3.8–10.5)

## 2025-03-12 PROCEDURE — G0545: CPT

## 2025-03-12 PROCEDURE — 99232 SBSQ HOSP IP/OBS MODERATE 35: CPT

## 2025-03-12 PROCEDURE — 99233 SBSQ HOSP IP/OBS HIGH 50: CPT

## 2025-03-12 PROCEDURE — 99222 1ST HOSP IP/OBS MODERATE 55: CPT

## 2025-03-12 RX ORDER — CEFAZOLIN SODIUM IN 0.9 % NACL 3 G/100 ML
1000 INTRAVENOUS SOLUTION, PIGGYBACK (ML) INTRAVENOUS EVERY 8 HOURS
Refills: 0 | Status: DISCONTINUED | OUTPATIENT
Start: 2025-03-12 | End: 2025-03-13

## 2025-03-12 RX ORDER — ONDANSETRON HCL/PF 4 MG/2 ML
4 VIAL (ML) INJECTION EVERY 8 HOURS
Refills: 0 | Status: DISCONTINUED | OUTPATIENT
Start: 2025-03-12 | End: 2025-03-13

## 2025-03-12 RX ORDER — SUMATRIPTAN 100 MG/1
25 TABLET, FILM COATED ORAL EVERY 6 HOURS
Refills: 0 | Status: DISCONTINUED | OUTPATIENT
Start: 2025-03-12 | End: 2025-03-13

## 2025-03-12 RX ORDER — MELATONIN 5 MG
3 TABLET ORAL AT BEDTIME
Refills: 0 | Status: DISCONTINUED | OUTPATIENT
Start: 2025-03-12 | End: 2025-03-13

## 2025-03-12 RX ORDER — KETOROLAC TROMETHAMINE 30 MG/ML
15 INJECTION, SOLUTION INTRAMUSCULAR; INTRAVENOUS ONCE
Refills: 0 | Status: DISCONTINUED | OUTPATIENT
Start: 2025-03-12 | End: 2025-03-12

## 2025-03-12 RX ORDER — MAGNESIUM, ALUMINUM HYDROXIDE 200-200 MG
30 TABLET,CHEWABLE ORAL EVERY 4 HOURS
Refills: 0 | Status: DISCONTINUED | OUTPATIENT
Start: 2025-03-12 | End: 2025-03-13

## 2025-03-12 RX ORDER — ACETAMINOPHEN 500 MG/5ML
650 LIQUID (ML) ORAL EVERY 6 HOURS
Refills: 0 | Status: DISCONTINUED | OUTPATIENT
Start: 2025-03-12 | End: 2025-03-13

## 2025-03-12 RX ORDER — CEFAZOLIN SODIUM IN 0.9 % NACL 3 G/100 ML
1000 INTRAVENOUS SOLUTION, PIGGYBACK (ML) INTRAVENOUS ONCE
Refills: 0 | Status: COMPLETED | OUTPATIENT
Start: 2025-03-12 | End: 2025-03-12

## 2025-03-12 RX ORDER — CEFAZOLIN SODIUM IN 0.9 % NACL 3 G/100 ML
INTRAVENOUS SOLUTION, PIGGYBACK (ML) INTRAVENOUS
Refills: 0 | Status: DISCONTINUED | OUTPATIENT
Start: 2025-03-12 | End: 2025-03-13

## 2025-03-12 RX ORDER — ONDANSETRON HCL/PF 4 MG/2 ML
4 VIAL (ML) INJECTION ONCE
Refills: 0 | Status: COMPLETED | OUTPATIENT
Start: 2025-03-12 | End: 2025-03-12

## 2025-03-12 RX ADMIN — KETOROLAC TROMETHAMINE 15 MILLIGRAM(S): 30 INJECTION, SOLUTION INTRAMUSCULAR; INTRAVENOUS at 00:54

## 2025-03-12 RX ADMIN — KETOROLAC TROMETHAMINE 15 MILLIGRAM(S): 30 INJECTION, SOLUTION INTRAMUSCULAR; INTRAVENOUS at 12:14

## 2025-03-12 RX ADMIN — Medication 100 MILLIGRAM(S): at 08:47

## 2025-03-12 RX ADMIN — SUMATRIPTAN 25 MILLIGRAM(S): 100 TABLET, FILM COATED ORAL at 22:21

## 2025-03-12 RX ADMIN — Medication 100 MILLIGRAM(S): at 16:05

## 2025-03-12 RX ADMIN — Medication 100 MILLIGRAM(S): at 01:40

## 2025-03-12 RX ADMIN — Medication 150 MILLILITER(S): at 06:36

## 2025-03-12 RX ADMIN — SUMATRIPTAN 25 MILLIGRAM(S): 100 TABLET, FILM COATED ORAL at 21:51

## 2025-03-12 RX ADMIN — Medication 4 MILLIGRAM(S): at 06:36

## 2025-03-12 NOTE — H&P ADULT - NSHPREVIEWOFSYSTEMS_GEN_ALL_CORE
REVIEW OF SYSTEMS:    CONSTITUTIONAL: No weakness, weight loss, fevers or chills. +headache  EYES/ENT: No visual changes;  No vertigo or throat pain   NECK: No pain or stiffness  RESPIRATORY: No cough, wheezing, hemoptysis; No shortness of breath  CARDIOVASCULAR: No chest pain or palpitations, [place]  GASTROINTESTINAL: No abdominal or epigastric pain. No nausea, vomiting, or hematemesis; No diarrhea or constipation. No melena or hematochezia.  GENITOURINARY: No dysuria, frequency or hematuria  NEUROLOGICAL: No numbness or weakness, AAOX3  SKIN: No itching, rashes  MUSCULOSKELETAL: no joint erythema, no joint swelling  PSYCHIATRIC: no depression, no anxiety

## 2025-03-12 NOTE — H&P ADULT - PROBLEM SELECTOR PLAN 2
-no improvement w/tylenol  -trial sumatriptan - described right sided headache, worse with noise and lights, intermittent over the last week, a/w nausea/vomiting

## 2025-03-12 NOTE — ED ADULT NURSE REASSESSMENT NOTE - NSFALLUNIVINTERV_ED_ALL_ED
Bed/Stretcher in lowest position, wheels locked, appropriate side rails in place/Call bell, personal items and telephone in reach/Instruct patient to call for assistance before getting out of bed/chair/stretcher/Non-slip footwear applied when patient is off stretcher/[place] to call system/Physically safe environment - no spills, clutter or unnecessary equipment/Purposeful proactive rounding/Room/bathroom lighting operational, light cord in reach
Bed/Stretcher in lowest position, wheels locked, appropriate side rails in place/Call bell, personal items and telephone in reach/Instruct patient to call for assistance before getting out of bed/chair/stretcher/Non-slip footwear applied when patient is off stretcher/[place] to call system/Physically safe environment - no spills, clutter or unnecessary equipment/Purposeful proactive rounding/Room/bathroom lighting operational, light cord in reach

## 2025-03-12 NOTE — ED CDU PROVIDER SUBSEQUENT DAY NOTE - PHYSICAL EXAMINATION
GEN: Pt in NAD  EYES: L periorbital erythema and edema, edema of L superior lid. Sclera white w/o injection. PERRL, EOMI w/o pain. VA 20/20 OD/OS/OU.  ENT: Head NCAT. Neck supple FROM.  RESP: CTA b/l, no wheezes, rales, or rhonchi.   CARDIAC: RRR, clear distinct S1, S2, no appreciable murmurs.  ABD: Abdomen soft, non-tender.   SKIN: Excoriations with surrounding erythema in left frontal region and right mandibular region.  Additional excoriation without surrounding erythema of right clavicular region and neck over the region of vertebral prominence.

## 2025-03-12 NOTE — ED ADULT NURSE REASSESSMENT NOTE - NS ED NURSE REASSESS COMMENT FT1
As per MD Greene orders, give antibiotics without draw blood cultures collection.
07.00 Received the Pt from  FLAQUITO regalado Pt is Observed for Left orbital cellulitis. Received the Pt A&OX 4  Pt obeys commands Meron N/V/D fever chills cp SOB   Comfort care & safety measures continued  IV site looks clean & dry no signs of infiltration noted pt denies  pain IV site .Pt is oriented to the unit Plan of care explained .  Pt is advised to call for help  call bell with in the reach pt verbalized the understanding .  pending CDU  MD wilson . GCS 15/15 A&OX 4 PERRLA  size 3 Strong upper & lower extremities steady gait  Pt is ambulatory & independent   No facial droop  No Hand Leg drop denies numbness tingling + Left orbital swelling  redness  Pt denies vision Changes & not in distress or in pain now   Plan of care ongoing
Pt received from FLAQUITO Valera. Pt A&O X4, oriented to CDU & plan of care discussed. Pt is observed in CDU for fascial cellulitis for IV antibiotics and pain control. Redness and swelling noted on Lt side of face. Toradol given for pain with good relief. First dose of cefazolin given. Pt denies any chest pain, SOB, dizziness or palpitations. V/S stable, IV 20G Rt fore arm, patent and free of signs of infiltration. Pt OOB independently. Safety & comfort measures maintained. Call bell in reach. Will continue to monitor.

## 2025-03-12 NOTE — ED CDU PROVIDER SUBSEQUENT DAY NOTE - PROGRESS NOTE DETAILS
Exam unchanged from prior. +Excoriations with surrounding erythema in left frontal region and right mandibular region.  Additional excoriation without surrounding erythema of right clavicular region and neck over the region of vertebral prominence. Pt medicated Toradol 15mg IVP and feeling better. MRSA PCR not detected, Staph aureus PCR not detected. reassesed this morning, 3 distinct areas of erythema and swelling. will require admission for further management. seen with Dr. Asa Yang PA-C

## 2025-03-12 NOTE — H&P ADULT - ASSESSMENT
29F w/pmh bipolar disorder presents to [facility] for left periorbital redness and swelling for several days. Patient has a history of picking at her skin causing infections. No fevers or chills. No eye pain. She was started on IV cefazolin yesterday and feels that her swelling has improved today.

## 2025-03-12 NOTE — H&P ADULT - PROBLEM SELECTOR PLAN 1
- followed by ID  - continue cefazolin IV, not septic on admission  - CT maxillofacial: Hypodense focus within the right cheek area of skin thickening may represent a small abscess collection versus draining sinus.  - appears to be improving on IV antibiotics, discussed with patient, potentially can be discharge home 3/13 if continues to improve

## 2025-03-12 NOTE — ED CDU PROVIDER SUBSEQUENT DAY NOTE - HISTORY
CDU PROGRESS NOTE PA TIP: Per ID, Overall pt with fabby orbital edema and swelling no vision changes per patient and no pain on eye movement. Can d/c doxy and and unasyn. Can start cefazolin 1gram q 8h.

## 2025-03-12 NOTE — ED CDU PROVIDER SUBSEQUENT DAY NOTE - ATTENDING APP SHARED VISIT CONTRIBUTION OF CARE
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  Patient has no fevers, chills, visual changes or pain with eye movement.    In the ED, labs with no leukocytosis and normal ESR and procalcitonin, and CRP mildly elevated at 18.  CT max face with IV contrast showed findings suggestive of cellulitis and hypodense focus in the right cheek which may represent small abscess/collection/draining sinus.  ED deemed that patient did not require incision and drainage.    Patient was placed in the CDU for further monitoring, frequent reassessments, Q4 hour vital signs, IV antibiotics, ID consultation.    Patient was seen by ID who recommends to discontinue Doxy and Unasyn at the start cefazolin.    Patient was evaluated by me in the morning, and reports persistent symptoms.  On examination, patient with elevated blood pressure but otherwise stable vitals, well-appearing, in no acute distress.  EYE: (+) Left periorbital swelling and erythema and tenderness with warmth, but PERRLA, EOMI without diplopia or discomfort, visual fields intact.  Examination of face with induration to the right cheek but no fluctuance or tenderness.  Cardiac examination RRR, lungs CTAB.  Abdomen is soft and nontender.  Neurovascularly intact in all 4 extremities.  Cranial nerves III-XII intact, no pronator drift, normal speech and gait. 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  Patient has no fevers, chills, visual changes or pain with eye movement.    In the ED, labs with no leukocytosis and normal ESR and procalcitonin, and CRP mildly elevated at 18.  CT max face with IV contrast showed findings suggestive of cellulitis and hypodense focus in the right cheek which may represent small abscess/collection/draining sinus.  ED deemed that patient did not require incision and drainage.    Patient was placed in the CDU for further monitoring, frequent reassessments, Q4 hour vital signs, IV antibiotics, ID consultation.    Patient was seen by ID who recommends to discontinue Doxy and Unasyn at the start cefazolin.    Patient was evaluated by me in the morning, and reports persistent symptoms.  On examination, patient with elevated blood pressure but otherwise stable vitals, well-appearing, in no acute distress.  EYE: (+) Left periorbital swelling and erythema and tenderness with warmth, but PERRLA, EOMI without diplopia or discomfort, visual fields intact.  Examination of face with induration to the right cheek but no fluctuance or tenderness.  Cardiac examination RRR, lungs CTAB.  Abdomen is soft and nontender.  Neurovascularly intact in all 4 extremities.  Cranial nerves III-XII intact, no pronator drift, normal speech and gait.    The patient will need to be admitted to the hospital for continued evaluation and management.  Discussed with the accepting physician regarding the initial presentation, diagnostic studies, treatments given in the ED, and current plan of care.  The patient was accepted by and endorsed to the medicine team with infectious disease consultation.

## 2025-03-12 NOTE — H&P ADULT - HISTORY OF PRESENT ILLNESS
29F w/pmh bipolar disorder presents to [facility] for left periorbital redness and swelling for several days. Patient has a history of picking at her skin causing infections. No fevers or chills. No eye pain. Has had a headache, what sounds like a migraine, over her right side of her head. She was started on IV cefazolin yesterday and feels that her swelling has improved today.

## 2025-03-12 NOTE — ED CDU PROVIDER SUBSEQUENT DAY NOTE - NSICDXPASTSURGICALHX_GEN_ALL_CORE_FT
Addended by: JUAN PABLO PRINCE on: 2/5/2024 01:38 PM     Modules accepted: Orders     PAST SURGICAL HISTORY:  No significant past surgical history

## 2025-03-12 NOTE — H&P ADULT - NSHPPHYSICALEXAM_GEN_ALL_CORE
Vital Signs Last 24 Hrs  T(C): 36.5 (12 Mar 2025 12:31), Max: 36.8 (11 Mar 2025 21:41)  T(F): 97.7 (12 Mar 2025 12:31), Max: 98.2 (11 Mar 2025 21:41)  HR: 67 (12 Mar 2025 12:31) (52 - 112)  BP: 105/66 (12 Mar 2025 12:31) (100/51 - 146/84)  BP(mean): 72 (12 Mar 2025 00:18) (72 - 72)  RR: 18 (12 Mar 2025 12:31) (17 - 20)  SpO2: 98% (12 Mar 2025 12:31) (98% - 99%)    Parameters below as of 12 Mar 2025 12:31  Patient On (Oxygen Delivery Method): room air        CONSTITUTIONAL: Well-groomed, in no apparent distress  EYES: No conjunctival or scleral injection, non-icteric; PERRLA and symmetric  ENMT: No external nasal lesions; no pharyngeal injection or exudates, oral mucosa with moist membranes  NECK: Trachea midline without palpable neck mass; thyroid not enlarged and non-tender  RESPIRATORY: Breathing comfortably; lungs CTA without wheeze/rhonchi/rales  CARDIOVASCULAR: +S1S2, RRR, no M/G/R; pedal pulses full and symmetric; no lower extremity edema  GASTROINTESTINAL: No palpable masses or tenderness, +BS throughout, no rebound/guarding; no hepatosplenomegaly; no hernia palpated  LYMPHATIC: No cervical LAD or tenderness; no axillary LAD or tenderness  MUSCULOSKELETAL: no digital clubbing or cyanosis; no paraspinal tenderness; normal strength and tone of extremities  SKIN: No rashes or ulcers noted; no subcutaneous nodules or induration palpable  NEUROLOGIC: CN II-XII intact; sensation intact in LEs b/l to light touch  PSYCHIATRIC: A+O x 3; mood and affect appropriate; appropriate insight and judgment Vital Signs Last 24 Hrs  T(C): 36.5 (12 Mar 2025 12:31), Max: 36.8 (11 Mar 2025 21:41)  T(F): 97.7 (12 Mar 2025 12:31), Max: 98.2 (11 Mar 2025 21:41)  HR: 67 (12 Mar 2025 12:31) (52 - 112)  BP: 105/66 (12 Mar 2025 12:31) (100/51 - 146/84)  BP(mean): 72 (12 Mar 2025 00:18) (72 - 72)  RR: 18 (12 Mar 2025 12:31) (17 - 20)  SpO2: 98% (12 Mar 2025 12:31) (98% - 99%)    Parameters below as of 12 Mar 2025 12:31  Patient On (Oxygen Delivery Method): room air        CONSTITUTIONAL: Well-groomed, in no apparent distress, fidgeting   EYES: No conjunctival or scleral injection, non-icteric; PERRLA and symmetric  ENMT: No external nasal lesions; no pharyngeal injection or exudates, oral mucosa with moist membranes  NECK: Trachea midline without palpable neck mass; thyroid not enlarged and non-tender  RESPIRATORY: Breathing comfortably; lungs CTA without wheeze/rhonchi/rales  CARDIOVASCULAR: +S1S2, RRR, no M/G/R; pedal pulses full and symmetric; no lower extremity edema  GASTROINTESTINAL: No palpable masses or tenderness, +BS throughout, no rebound/guarding  MUSCULOSKELETAL: no digital clubbing or cyanosis; no paraspinal tenderness; normal strength and tone of extremities  SKIN: skin surrounding left eye swollen/erythematous, excorations over right lower cheek and left upper forehead  NEUROLOGIC: CN II-XII intact; sensation intact in LEs b/l to light touch  PSYCHIATRIC: A+O x 3; mood and affect appropriate; appropriate insight and judgment

## 2025-03-13 ENCOUNTER — TRANSCRIPTION ENCOUNTER (OUTPATIENT)
Age: 30
End: 2025-03-13

## 2025-03-13 VITALS
TEMPERATURE: 98 F | SYSTOLIC BLOOD PRESSURE: 102 MMHG | DIASTOLIC BLOOD PRESSURE: 60 MMHG | RESPIRATION RATE: 18 BRPM | HEART RATE: 60 BPM | OXYGEN SATURATION: 96 %

## 2025-03-13 LAB
ANION GAP SERPL CALC-SCNC: 13 MMOL/L — SIGNIFICANT CHANGE UP (ref 5–17)
BUN SERPL-MCNC: 16 MG/DL — SIGNIFICANT CHANGE UP (ref 7–23)
CALCIUM SERPL-MCNC: 9 MG/DL — SIGNIFICANT CHANGE UP (ref 8.4–10.5)
CHLORIDE SERPL-SCNC: 105 MMOL/L — SIGNIFICANT CHANGE UP (ref 96–108)
CO2 SERPL-SCNC: 21 MMOL/L — LOW (ref 22–31)
CREAT SERPL-MCNC: 0.62 MG/DL — SIGNIFICANT CHANGE UP (ref 0.5–1.3)
EGFR: 122 ML/MIN/1.73M2 — SIGNIFICANT CHANGE UP
EGFR: 122 ML/MIN/1.73M2 — SIGNIFICANT CHANGE UP
GLUCOSE SERPL-MCNC: 79 MG/DL — SIGNIFICANT CHANGE UP (ref 70–99)
HCT VFR BLD CALC: 37.3 % — SIGNIFICANT CHANGE UP (ref 34.5–45)
HGB BLD-MCNC: 11.7 G/DL — SIGNIFICANT CHANGE UP (ref 11.5–15.5)
MAGNESIUM SERPL-MCNC: 2.2 MG/DL — SIGNIFICANT CHANGE UP (ref 1.6–2.6)
MCHC RBC-ENTMCNC: 29.2 PG — SIGNIFICANT CHANGE UP (ref 27–34)
MCHC RBC-ENTMCNC: 31.4 G/DL — LOW (ref 32–36)
MCV RBC AUTO: 93 FL — SIGNIFICANT CHANGE UP (ref 80–100)
NRBC BLD AUTO-RTO: 0 /100 WBCS — SIGNIFICANT CHANGE UP (ref 0–0)
PHOSPHATE SERPL-MCNC: 3.8 MG/DL — SIGNIFICANT CHANGE UP (ref 2.5–4.5)
PLATELET # BLD AUTO: 217 K/UL — SIGNIFICANT CHANGE UP (ref 150–400)
POTASSIUM SERPL-MCNC: 4.4 MMOL/L — SIGNIFICANT CHANGE UP (ref 3.5–5.3)
POTASSIUM SERPL-SCNC: 4.4 MMOL/L — SIGNIFICANT CHANGE UP (ref 3.5–5.3)
RBC # BLD: 4.01 M/UL — SIGNIFICANT CHANGE UP (ref 3.8–5.2)
RBC # FLD: 13.7 % — SIGNIFICANT CHANGE UP (ref 10.3–14.5)
SODIUM SERPL-SCNC: 139 MMOL/L — SIGNIFICANT CHANGE UP (ref 135–145)
WBC # BLD: 4.96 K/UL — SIGNIFICANT CHANGE UP (ref 3.8–10.5)
WBC # FLD AUTO: 4.96 K/UL — SIGNIFICANT CHANGE UP (ref 3.8–10.5)

## 2025-03-13 PROCEDURE — 93005 ELECTROCARDIOGRAM TRACING: CPT

## 2025-03-13 PROCEDURE — 83735 ASSAY OF MAGNESIUM: CPT

## 2025-03-13 PROCEDURE — 81025 URINE PREGNANCY TEST: CPT

## 2025-03-13 PROCEDURE — 85025 COMPLETE CBC W/AUTO DIFF WBC: CPT

## 2025-03-13 PROCEDURE — G0378: CPT

## 2025-03-13 PROCEDURE — 99239 HOSP IP/OBS DSCHRG MGMT >30: CPT

## 2025-03-13 PROCEDURE — 84145 PROCALCITONIN (PCT): CPT

## 2025-03-13 PROCEDURE — 87640 STAPH A DNA AMP PROBE: CPT

## 2025-03-13 PROCEDURE — 86703 HIV-1/HIV-2 1 RESULT ANTBDY: CPT

## 2025-03-13 PROCEDURE — 85652 RBC SED RATE AUTOMATED: CPT

## 2025-03-13 PROCEDURE — 99285 EMERGENCY DEPT VISIT HI MDM: CPT | Mod: 25

## 2025-03-13 PROCEDURE — 87641 MR-STAPH DNA AMP PROBE: CPT

## 2025-03-13 PROCEDURE — G0545: CPT

## 2025-03-13 PROCEDURE — 80053 COMPREHEN METABOLIC PANEL: CPT

## 2025-03-13 PROCEDURE — 80048 BASIC METABOLIC PNL TOTAL CA: CPT

## 2025-03-13 PROCEDURE — 70487 CT MAXILLOFACIAL W/DYE: CPT | Mod: MC

## 2025-03-13 PROCEDURE — 86140 C-REACTIVE PROTEIN: CPT

## 2025-03-13 PROCEDURE — 96374 THER/PROPH/DIAG INJ IV PUSH: CPT

## 2025-03-13 PROCEDURE — 36415 COLL VENOUS BLD VENIPUNCTURE: CPT

## 2025-03-13 PROCEDURE — 83036 HEMOGLOBIN GLYCOSYLATED A1C: CPT

## 2025-03-13 PROCEDURE — 85027 COMPLETE CBC AUTOMATED: CPT

## 2025-03-13 PROCEDURE — 99232 SBSQ HOSP IP/OBS MODERATE 35: CPT

## 2025-03-13 PROCEDURE — 84100 ASSAY OF PHOSPHORUS: CPT

## 2025-03-13 RX ORDER — CEPHALEXIN 250 MG/1
1 CAPSULE ORAL
Qty: 40 | Refills: 0
Start: 2025-03-13 | End: 2025-03-22

## 2025-03-13 RX ORDER — MUPIROCIN CALCIUM 20 MG/G
1 CREAM TOPICAL
Qty: 0 | Refills: 0 | DISCHARGE

## 2025-03-13 RX ORDER — MUPIROCIN CALCIUM 20 MG/G
1 CREAM TOPICAL
Refills: 0 | Status: DISCONTINUED | OUTPATIENT
Start: 2025-03-13 | End: 2025-03-13

## 2025-03-13 RX ADMIN — Medication 100 MILLIGRAM(S): at 01:04

## 2025-03-13 RX ADMIN — Medication 100 MILLIGRAM(S): at 08:59

## 2025-03-13 RX ADMIN — SUMATRIPTAN 25 MILLIGRAM(S): 100 TABLET, FILM COATED ORAL at 08:55

## 2025-03-13 NOTE — DISCHARGE NOTE NURSING/CASE MANAGEMENT/SOCIAL WORK - FINANCIAL ASSISTANCE
[facility] provides services at a reduced cost to those who are determined to be eligible through [facility]’s financial assistance program. Information regarding [facility]’s financial assistance program can be found by going to [email or web address] or by calling [phone].

## 2025-03-13 NOTE — PROGRESS NOTE ADULT - ASSESSMENT
29 f with multiple skin abscess due to MSSA (cheek, eye brow), has cystic lesions on scalp and face that follows with derm (?acne) was on keflex and then was given bactim which she is still taking and she picked on the the L forehead cyst then developed erythema around her L eye, no fever, eye pain or change in the vision  afebrile, no WBC  MRSA/MARTINA PCR negative but pt has been on antibiotics    periorbital cellulitis after she manipulated a forehead Cyst/acne, no fever or WBC and non toxic  CT: cellulitis of frontal scalp, Hypodense focus within the right cheek area of skin thickening may represent a small abscess collection versus draining sinus. Follow-up to resolution.    * c/w cefazolin 1 q 8 and monitor closely  * pt needs derm follow up for the cystic lesions, ?acne  * mupirocin for decolonization    The above assessment and plan was discussed with the primary team    Karrie Candelario MD  contact on teams  After 5pm and on weekends call [phone]
29 f with multiple skin abscess due to MSSA (cheek, eye brow), has cystic lesions on scalp and face that follows with derm (?acne) was on keflex and then was given bactim which she is still taking and she picked on the the L forehead cyst then developed erythema around her L eye, no fever, eye pain or change in the vision  afebrile, no WBC  MRSA/MARTINA PCR negative but pt has been on antibiotics    periorbital cellulitis after she manipulated a forehead Cyst/acne, no fever or WBC and non toxic  CT: cellulitis of frontal scalp, Hypodense focus within the right cheek area of skin thickening may represent a small abscess collection versus draining sinus. Follow-up to resolution.    * c/w cefazolin 1 q 8 while in the hospital, started 3/11, now day 3  * on discharge switch to PO keflex 500 q 6 for another 10 days  * pt needs derm follow up for the cystic lesions, ?acne  * mupirocin for decolonization    The above assessment and plan was discussed with the primary team    Karrie Candelario MD  contact on teams  After 5pm and on weekends call [phone]    
29F w/pmh bipolar disorder presents to [facility] for left periorbital redness and swelling for several days. Patient has a history of picking at her skin causing infections. No fevers or chills. No eye pain. She was started on IV cefazolin yesterday and feels that her swelling has improved today.

## 2025-03-13 NOTE — DISCHARGE NOTE PROVIDER - NSDCMRMEDTOKEN_GEN_ALL_CORE_FT
cephalexin 500 mg oral capsule: 1 cap(s) orally every 6 hours  mupirocin 2% nasal ointment: 1 application nasal 2 times a day x 5 days

## 2025-03-13 NOTE — DISCHARGE NOTE PROVIDER - HOSPITAL COURSE
HPI:  29F w/pmh bipolar disorder presents to [facility] for left periorbital redness and swelling for several days. Patient has a history of picking at her skin causing infections. No fevers or chills. No eye pain. Has had a headache, what sounds like a migraine, over her right side of her head. She was started on IV cefazolin yesterday and feels that her swelling has improved today. (12 Mar 2025 13:32)    Hospital Course: Received IV ancef, improved. ID cleared pt for discharge, switch to PO keflex 500 q 6 for another 10 days with derm follow-up for cystic lesions. Pt had a trial of sumatriptan for migraine headache which improved her symptoms.     Important Medication Changes and Reason: see med rec    Active or Pending Issues Requiring Follow-up: see care plan    Advanced Directives:   [X] Full code  [ ] DNR  [ ] Hospice    Discharge Diagnoses:  cellulitis  migraine headache

## 2025-03-13 NOTE — PROGRESS NOTE ADULT - SUBJECTIVE AND OBJECTIVE BOX
Follow Up:  periorbital cellulitis    Interval History/ROS: pt afebrile, feels better and significant improvement in the periorbital edema and erythema          Allergies  No Known Drug Allergies  Seafood (Vomiting; Nausea)        ANTIMICROBIALS:  ceFAZolin   IVPB    ceFAZolin   IVPB 1000 every 8 hours      OTHER MEDS:  acetaminophen     Tablet .. 650 milliGRAM(s) Oral every 6 hours PRN  aluminum hydroxide/magnesium hydroxide/simethicone Suspension 30 milliLiter(s) Oral every 4 hours PRN  melatonin 3 milliGRAM(s) Oral at bedtime PRN  mupirocin 2% Ointment 1 Application(s) Both Nostrils two times a day  ondansetron Injectable 4 milliGRAM(s) IV Push every 8 hours PRN  SUMAtriptan 25 milliGRAM(s) Oral every 6 hours PRN      Vital Signs Last 24 Hrs  T(C): 36.9 (13 Mar 2025 12:33), Max: 36.9 (13 Mar 2025 12:33)  T(F): 98.4 (13 Mar 2025 12:33), Max: 98.4 (13 Mar 2025 12:33)  HR: 60 (13 Mar 2025 12:33) (60 - 66)  BP: 102/60 (13 Mar 2025 12:33) (97/60 - 111/74)  BP(mean): --  RR: 18 (13 Mar 2025 12:33) (17 - 18)  SpO2: 96% (13 Mar 2025 12:33) (96% - 99%)    Parameters below as of 13 Mar 2025 12:33  Patient On (Oxygen Delivery Method): room air        Physical Exam:  General:    NAD,  non toxic  HEENT:   significantly improved, erythema, edema of L forehead and periorbital area, forehead  lesion with no purulence  Respiratory:   comfortable on RA  abd:     soft,      no tenderness  :   no  sampson  Musculoskeletal:   no joint swelling  vascular: no phlebitis  Skin:   multiple scabs and acne lesions on face                          11.7   4.96  )-----------( 217      ( 13 Mar 2025 07:02 )             37.3       03-13    139  |  105  |  16  ----------------------------<  79  4.4   |  21[L]  |  0.62    Ca    9.0      13 Mar 2025 07:00  Phos  3.8     03-13  Mg     2.2     03-13    TPro  7.3  /  Alb  4.7  /  TBili  0.4  /  DBili  x   /  AST  22  /  ALT  26  /  AlkPhos  60  03-11      Urinalysis Basic - ( 13 Mar 2025 07:00 )    Color: x / Appearance: x / SG: x / pH: x  Gluc: 79 mg/dL / Ketone: x  / Bili: x / Urobili: x   Blood: x / Protein: x / Nitrite: x   Leuk Esterase: x / RBC: x / WBC x   Sq Epi: x / Non Sq Epi: x / Bacteria: x        MICROBIOLOGY:  v            RADIOLOGY:  Images independently visualized and reviewed personally, findings as below  < from: CT Maxillofacial w/ IV Cont (03.11.25 @ 18:34) >  IMPRESSION:  1. Skin thickening and subcutaneous edema involving the bilateral frontal   scalp suggestive of cellulitis. No evidence of post septal cellulitis.  2. Hypodense focus within the right cheek area of skin thickening may   represent a small abscess collection versus draining sinus. Follow-up to   resolution.      < end of copied text >  
  Follow Up:  periorbital cellulitis    Interval History/ROS: pt afebrile but did not feel well today, some chills, similar periorbital edema/erythema          Allergies  No Known Drug Allergies  Seafood (Vomiting; Nausea)        ANTIMICROBIALS:  ceFAZolin   IVPB    ceFAZolin   IVPB 1000 every 8 hours      OTHER MEDS:  acetaminophen     Tablet .. 650 milliGRAM(s) Oral every 6 hours PRN  aluminum hydroxide/magnesium hydroxide/simethicone Suspension 30 milliLiter(s) Oral every 4 hours PRN  melatonin 3 milliGRAM(s) Oral at bedtime PRN  ondansetron Injectable 4 milliGRAM(s) IV Push every 8 hours PRN  SUMAtriptan 25 milliGRAM(s) Oral every 6 hours PRN      Vital Signs Last 24 Hrs  T(C): 36.7 (12 Mar 2025 14:39), Max: 36.8 (11 Mar 2025 21:41)  T(F): 98.1 (12 Mar 2025 14:39), Max: 98.2 (11 Mar 2025 21:41)  HR: 65 (12 Mar 2025 14:39) (52 - 74)  BP: 100/64 (12 Mar 2025 14:39) (100/51 - 146/84)  BP(mean): 72 (12 Mar 2025 00:18) (72 - 72)  RR: 18 (12 Mar 2025 14:39) (17 - 18)  SpO2: 98% (12 Mar 2025 14:39) (98% - 99%)    Parameters below as of 12 Mar 2025 14:39  Patient On (Oxygen Delivery Method): room air        Physical Exam:  General:    NAD,  non toxic  HEENT:   edema of L forehead and periorbital area with erythema on lower eyelid, forehead  lesion with no purulence  Respiratory:   comfortable on RA  abd:     soft,      no tenderness  :   no  sampson  Musculoskeletal:   no joint swelling  vascular: no phlebitis  Skin:   multiple scabs and acne lesions on face                        11.3   4.11  )-----------( 194      ( 12 Mar 2025 06:20 )             35.2       03-11    137  |  102  |  18  ----------------------------<  122[H]  4.4   |  20[L]  |  0.70    Ca    9.4      11 Mar 2025 14:44  Mg     1.9     03-11    TPro  7.3  /  Alb  4.7  /  TBili  0.4  /  DBili  x   /  AST  22  /  ALT  26  /  AlkPhos  60  03-11      Urinalysis Basic - ( 11 Mar 2025 14:44 )    Color: x / Appearance: x / SG: x / pH: x  Gluc: 122 mg/dL / Ketone: x  / Bili: x / Urobili: x   Blood: x / Protein: x / Nitrite: x   Leuk Esterase: x / RBC: x / WBC x   Sq Epi: x / Non Sq Epi: x / Bacteria: x        MICROBIOLOGY:  v            RADIOLOGY:  Images independently visualized and reviewed personally, findings as below  < from: CT Maxillofacial w/ IV Cont (03.11.25 @ 18:34) >  IMPRESSION:  1. Skin thickening and subcutaneous edema involving the bilateral frontal   scalp suggestive of cellulitis. No evidence of post septal cellulitis.  2. Hypodense focus within the right cheek area of skin thickening may   represent a small abscess collection versus draining sinus. Follow-up to   resolution.    < end of copied text >  
[facility] Division of Hospital Medicine  Tam Gonzalez MD  Available via MS Teams    SUBJECTIVE / OVERNIGHT EVENTS: No acute events overnight. Pt seen and examined at bedside. Feels the skin on her forehead feels harder. Swelling mildly improved.     ADDITIONAL REVIEW OF SYSTEMS:    MEDICATIONS  (STANDING):  ceFAZolin   IVPB      ceFAZolin   IVPB 1000 milliGRAM(s) IV Intermittent every 8 hours  mupirocin 2% Ointment 1 Application(s) Both Nostrils two times a day    MEDICATIONS  (PRN):  acetaminophen     Tablet .. 650 milliGRAM(s) Oral every 6 hours PRN Temp greater or equal to 38C (100.4F), Mild Pain (1 - 3)  aluminum hydroxide/magnesium hydroxide/simethicone Suspension 30 milliLiter(s) Oral every 4 hours PRN Dyspepsia  melatonin 3 milliGRAM(s) Oral at bedtime PRN Insomnia  ondansetron Injectable 4 milliGRAM(s) IV Push every 8 hours PRN Nausea and/or Vomiting  SUMAtriptan 25 milliGRAM(s) Oral every 6 hours PRN Migraine      I&O's Summary      PHYSICAL EXAM:  Vital Signs Last 24 Hrs  T(C): 36.4 (13 Mar 2025 04:45), Max: 36.8 (12 Mar 2025 20:30)  T(F): 97.6 (13 Mar 2025 04:45), Max: 98.2 (12 Mar 2025 20:30)  HR: 61 (13 Mar 2025 04:45) (61 - 67)  BP: 97/60 (13 Mar 2025 04:45) (97/60 - 111/74)  RR: 18 (13 Mar 2025 04:45) (17 - 18)  SpO2: 99% (13 Mar 2025 04:45) (98% - 99%)    Parameters below as of 13 Mar 2025 04:45  Patient On (Oxygen Delivery Method): room air      CONSTITUTIONAL: NAD, well appearing  RESPIRATORY: Normal respiratory effort; lungs are clear to auscultation bilaterally  CARDIOVASCULAR: Regular rate and rhythm, normal S1 and S2, no murmur/rub/gallop; No lower extremity edema;  ABDOMEN: Nontender to palpation, normoactive bowel sounds  MUSCULOSKELETAL: no clubbing or cyanosis of digits; no joint swelling or tenderness to palpation  PSYCH: A+O to person, place, and time; affect appropriate  NEUROLOGY: CN 2-12 are intact and symmetric; no gross sensory deficits   SKIN: erythema around central scab on L forehead, multiple excoriations    LABS:                        11.7   4.96  )-----------( 217      ( 13 Mar 2025 07:02 )             37.3     03-13    139  |  105  |  16  ----------------------------<  79  4.4   |  21[L]  |  0.62    Ca    9.0      13 Mar 2025 07:00  Phos  3.8     03-13  Mg     2.2     03-13    TPro  7.3  /  Alb  4.7  /  TBili  0.4  /  DBili  x   /  AST  22  /  ALT  26  /  AlkPhos  60  03-11          Urinalysis Basic - ( 13 Mar 2025 07:00 )    Color: x / Appearance: x / SG: x / pH: x  Gluc: 79 mg/dL / Ketone: x  / Bili: x / Urobili: x   Blood: x / Protein: x / Nitrite: x   Leuk Esterase: x / RBC: x / WBC x   Sq Epi: x / Non Sq Epi: x / Bacteria: x      RADIOLOGY & ADDITIONAL TESTS:  New Results Reviewed Today:   New Imaging Personally Reviewed Today:  New Electrocardiogram Personally Reviewed Today:  Prior or Outpatient Records Reviewed Today:    COMMUNICATION:  Care Discussed with Consultants/Other Providers and Details of Discussion: Discussed with ACP  Discussions with Patient/Family:  PCP Communication:

## 2025-03-13 NOTE — DISCHARGE NOTE NURSING/CASE MANAGEMENT/SOCIAL WORK - NSDCPEFALRISK_GEN_ALL_CORE
For information on Fall & Injury Prevention, visit: [email or web address] OR  [email or web address] OR  https://www.cdc.gov/steadi/patient.html

## 2025-03-13 NOTE — PROGRESS NOTE ADULT - PROBLEM SELECTOR PLAN 1
- followed by ID  - continue cefazolin IV, not septic on admission  - CT maxillofacial: Hypodense focus within the right cheek area of skin thickening may represent a small abscess collection versus draining sinus.  - d/c pending improvement - followed by ID  - continue cefazolin IV, not septic on admission  - CT maxillofacial: Hypodense focus within the right cheek area of skin thickening may represent a small abscess collection versus draining sinus.  - d/c pending improvement  - mupirocin decolonization

## 2025-03-13 NOTE — DISCHARGE NOTE PROVIDER - CARE PROVIDER_API CALL
Josleine Pelayo  Internal Medicine  [address] [place] [address] [address]  [place] [address]  Phone: [phone]  Fax: [phone]  Established Patient  Follow Up Time: 1 week

## 2025-03-13 NOTE — DISCHARGE NOTE NURSING/CASE MANAGEMENT/SOCIAL WORK - PATIENT PORTAL LINK FT
You can access the FollowMyHealth Patient Portal offered by [facility] by registering at the following website: [email or web address]. By joining [employer]’s FollowMyHealth portal, you will also be able to view your health information using other applications (apps) compatible with our system.

## 2025-03-13 NOTE — DISCHARGE NOTE PROVIDER - NSDCCPCAREPLAN_GEN_ALL_CORE_FT
PRINCIPAL DISCHARGE DIAGNOSIS  Diagnosis: Facial cellulitis  Assessment and Plan of Treatment: You received IV ancef and improved. Continue oral keflex 500 mg every 6 hours for another 10 days. Follow-up with your PCP and dermatology within 1 week.      SECONDARY DISCHARGE DIAGNOSES  Diagnosis: Migraine headache  Assessment and Plan of Treatment: You had a headache and you received a trial of sumatriptan, a medication which helps with migraine headaches. Your headache improved. Follow-up with your PCP for further management of migraines.    Diagnosis: Carbuncle and furuncle  Assessment and Plan of Treatment:     Diagnosis: Periorbital cellulitis of left eye  Assessment and Plan of Treatment:      PRINCIPAL DISCHARGE DIAGNOSIS  Diagnosis: Facial cellulitis  Assessment and Plan of Treatment: You received IV ancef and improved. Continue oral keflex 500 mg every 6 hours for another 10 days. Follow-up with your PCP and dermatology within 1 week.   Apply mupirocin 2 times a day to both nares for 5 days total.      SECONDARY DISCHARGE DIAGNOSES  Diagnosis: Migraine headache  Assessment and Plan of Treatment: You had a headache and you received a trial of sumatriptan, a medication which helps with migraine headaches. Your headache improved. Follow-up with your PCP for further management of migraines.

## 2025-04-09 ENCOUNTER — APPOINTMENT (OUTPATIENT)
Dept: INTERNAL MEDICINE | Facility: CLINIC | Age: 30
End: 2025-04-09

## 2025-05-01 ENCOUNTER — EMERGENCY (EMERGENCY)
Facility: HOSPITAL | Age: 30
LOS: 1 days | End: 2025-05-01
Attending: STUDENT IN AN ORGANIZED HEALTH CARE EDUCATION/TRAINING PROGRAM
Payer: MEDICAID

## 2025-05-01 VITALS
SYSTOLIC BLOOD PRESSURE: 110 MMHG | OXYGEN SATURATION: 100 % | HEART RATE: 82 BPM | RESPIRATION RATE: 18 BRPM | TEMPERATURE: 98 F | DIASTOLIC BLOOD PRESSURE: 70 MMHG

## 2025-05-01 VITALS
HEART RATE: 94 BPM | SYSTOLIC BLOOD PRESSURE: 107 MMHG | HEIGHT: 64 IN | WEIGHT: 130.07 LBS | DIASTOLIC BLOOD PRESSURE: 70 MMHG | TEMPERATURE: 98 F | OXYGEN SATURATION: 99 % | RESPIRATION RATE: 20 BRPM

## 2025-05-01 LAB
ALBUMIN SERPL ELPH-MCNC: 4 G/DL — SIGNIFICANT CHANGE UP (ref 3.3–5)
ALP SERPL-CCNC: 59 U/L — SIGNIFICANT CHANGE UP (ref 40–120)
ALT FLD-CCNC: 15 U/L — SIGNIFICANT CHANGE UP (ref 10–45)
ANION GAP SERPL CALC-SCNC: 12 MMOL/L — SIGNIFICANT CHANGE UP (ref 5–17)
AST SERPL-CCNC: 17 U/L — SIGNIFICANT CHANGE UP (ref 10–40)
BASOPHILS # BLD AUTO: 0.03 K/UL — SIGNIFICANT CHANGE UP (ref 0–0.2)
BASOPHILS NFR BLD AUTO: 0.7 % — SIGNIFICANT CHANGE UP (ref 0–2)
BILIRUB SERPL-MCNC: 0.1 MG/DL — LOW (ref 0.2–1.2)
BUN SERPL-MCNC: 17 MG/DL — SIGNIFICANT CHANGE UP (ref 7–23)
CALCIUM SERPL-MCNC: 9.2 MG/DL — SIGNIFICANT CHANGE UP (ref 8.4–10.5)
CHLORIDE SERPL-SCNC: 109 MMOL/L — HIGH (ref 96–108)
CO2 SERPL-SCNC: 18 MMOL/L — LOW (ref 22–31)
CREAT SERPL-MCNC: 0.56 MG/DL — SIGNIFICANT CHANGE UP (ref 0.5–1.3)
EGFR: 124 ML/MIN/1.73M2 — SIGNIFICANT CHANGE UP
EGFR: 124 ML/MIN/1.73M2 — SIGNIFICANT CHANGE UP
EOSINOPHIL # BLD AUTO: 0.11 K/UL — SIGNIFICANT CHANGE UP (ref 0–0.5)
EOSINOPHIL NFR BLD AUTO: 2.6 % — SIGNIFICANT CHANGE UP (ref 0–6)
GLUCOSE SERPL-MCNC: 97 MG/DL — SIGNIFICANT CHANGE UP (ref 70–99)
HCG SERPL-ACNC: <2 MIU/ML — SIGNIFICANT CHANGE UP
HCT VFR BLD CALC: 39.1 % — SIGNIFICANT CHANGE UP (ref 34.5–45)
HGB BLD-MCNC: 12.6 G/DL — SIGNIFICANT CHANGE UP (ref 11.5–15.5)
IMM GRANULOCYTES NFR BLD AUTO: 0.5 % — SIGNIFICANT CHANGE UP (ref 0–0.9)
LYMPHOCYTES # BLD AUTO: 1.3 K/UL — SIGNIFICANT CHANGE UP (ref 1–3.3)
LYMPHOCYTES # BLD AUTO: 30.4 % — SIGNIFICANT CHANGE UP (ref 13–44)
MCHC RBC-ENTMCNC: 30.6 PG — SIGNIFICANT CHANGE UP (ref 27–34)
MCHC RBC-ENTMCNC: 32.2 G/DL — SIGNIFICANT CHANGE UP (ref 32–36)
MCV RBC AUTO: 94.9 FL — SIGNIFICANT CHANGE UP (ref 80–100)
MONOCYTES # BLD AUTO: 0.47 K/UL — SIGNIFICANT CHANGE UP (ref 0–0.9)
MONOCYTES NFR BLD AUTO: 11 % — SIGNIFICANT CHANGE UP (ref 2–14)
NEUTROPHILS # BLD AUTO: 2.35 K/UL — SIGNIFICANT CHANGE UP (ref 1.8–7.4)
NEUTROPHILS NFR BLD AUTO: 54.8 % — SIGNIFICANT CHANGE UP (ref 43–77)
NRBC BLD AUTO-RTO: 0 /100 WBCS — SIGNIFICANT CHANGE UP (ref 0–0)
PLATELET # BLD AUTO: 200 K/UL — SIGNIFICANT CHANGE UP (ref 150–400)
POTASSIUM SERPL-MCNC: 4.7 MMOL/L — SIGNIFICANT CHANGE UP (ref 3.5–5.3)
POTASSIUM SERPL-SCNC: 4.7 MMOL/L — SIGNIFICANT CHANGE UP (ref 3.5–5.3)
PROT SERPL-MCNC: 6.3 G/DL — SIGNIFICANT CHANGE UP (ref 6–8.3)
RBC # BLD: 4.12 M/UL — SIGNIFICANT CHANGE UP (ref 3.8–5.2)
RBC # FLD: 13.6 % — SIGNIFICANT CHANGE UP (ref 10.3–14.5)
SODIUM SERPL-SCNC: 139 MMOL/L — SIGNIFICANT CHANGE UP (ref 135–145)
WBC # BLD: 4.28 K/UL — SIGNIFICANT CHANGE UP (ref 3.8–10.5)
WBC # FLD AUTO: 4.28 K/UL — SIGNIFICANT CHANGE UP (ref 3.8–10.5)

## 2025-05-01 PROCEDURE — 99285 EMERGENCY DEPT VISIT HI MDM: CPT

## 2025-05-01 PROCEDURE — 70487 CT MAXILLOFACIAL W/DYE: CPT | Mod: MC

## 2025-05-01 PROCEDURE — 99284 EMERGENCY DEPT VISIT MOD MDM: CPT | Mod: 25

## 2025-05-01 PROCEDURE — 84702 CHORIONIC GONADOTROPIN TEST: CPT

## 2025-05-01 PROCEDURE — 96375 TX/PRO/DX INJ NEW DRUG ADDON: CPT | Mod: XU

## 2025-05-01 PROCEDURE — 96374 THER/PROPH/DIAG INJ IV PUSH: CPT | Mod: XU

## 2025-05-01 PROCEDURE — 80053 COMPREHEN METABOLIC PANEL: CPT

## 2025-05-01 PROCEDURE — 70487 CT MAXILLOFACIAL W/DYE: CPT | Mod: 26

## 2025-05-01 PROCEDURE — 85025 COMPLETE CBC W/AUTO DIFF WBC: CPT

## 2025-05-01 RX ORDER — DIPHENHYDRAMINE HCL 12.5MG/5ML
25 ELIXIR ORAL ONCE
Refills: 0 | Status: COMPLETED | OUTPATIENT
Start: 2025-05-01 | End: 2025-05-01

## 2025-05-01 RX ORDER — METOCLOPRAMIDE HCL 10 MG
10 TABLET ORAL ONCE
Refills: 0 | Status: COMPLETED | OUTPATIENT
Start: 2025-05-01 | End: 2025-05-01

## 2025-05-01 RX ORDER — DOXYCYCLINE HYCLATE 100 MG
1 TABLET ORAL
Refills: 0
Start: 2025-05-01

## 2025-05-01 RX ORDER — ACETAMINOPHEN 500 MG/5ML
1000 LIQUID (ML) ORAL ONCE
Refills: 0 | Status: COMPLETED | OUTPATIENT
Start: 2025-05-01 | End: 2025-05-01

## 2025-05-01 RX ORDER — DOXYCYCLINE HYCLATE 100 MG
1 TABLET ORAL
Qty: 14 | Refills: 0
Start: 2025-05-01 | End: 2025-05-07

## 2025-05-01 RX ORDER — DOXYCYCLINE HYCLATE 100 MG
100 TABLET ORAL
Refills: 0
Start: 2025-05-01

## 2025-05-01 RX ORDER — DOXYCYCLINE HYCLATE 100 MG
100 TABLET ORAL ONCE
Refills: 0 | Status: COMPLETED | OUTPATIENT
Start: 2025-05-01 | End: 2025-05-01

## 2025-05-01 RX ADMIN — Medication 1000 MILLILITER(S): at 16:54

## 2025-05-01 RX ADMIN — Medication 25 MILLIGRAM(S): at 16:52

## 2025-05-01 RX ADMIN — Medication 10 MILLIGRAM(S): at 16:53

## 2025-05-01 RX ADMIN — Medication 400 MILLIGRAM(S): at 16:53

## 2025-05-01 RX ADMIN — Medication 100 MILLIGRAM(S): at 16:53

## 2025-05-01 NOTE — ED PROVIDER NOTE - PATIENT PORTAL LINK FT
You can access the FollowMyHealth Patient Portal offered by [facility] by registering at the following website: [email or web address]. By joining [employer]’s FollowMyHealth portal, you will also be able to view your health information using other applications (apps) compatible with our system.

## 2025-05-01 NOTE — ED PROVIDER NOTE - NSFOLLOWUPINSTRUCTIONS_ED_ALL_ED_FT
You were seen for skin infection. Take antibiotics as instructed. Return with any drainage, fevers, or any other concerning symptoms.    Follow up with your Primary Doctor within one week.

## 2025-05-01 NOTE — ED PROVIDER NOTE - OBJECTIVE STATEMENT
30 y f multiple skin abscess due to MSSA 2/2 to skin picking presents to ed for L eye pain and redness and cyst on scalp which she says has had purulent drainage past few days. has history of extensive skin abscesses secondary to excoriation in the past, most recently in march. denies any fevers, n/v, chest pain, sob, vision change, or pain w eom.
General

## 2025-05-01 NOTE — ED ADULT NURSE NOTE - CAS ELECT INFOMATION PROVIDED
Detail Level: Simple Render Risk Assessment In Note?: no Comment: Lesions of concern on face (brown spots) discussed laser treatments . DC instructions

## 2025-05-01 NOTE — ED ADULT NURSE NOTE - OBJECTIVE STATEMENT
31 y/o female presents to ED c/o redness, L eye pain, rash on scalp with purulent drainage recently. PMH of multiple skin abscesses from skin picking in past. Reports for the last 6 days she has been living "on the streets with my boyfriend, he's homeless". Reports she has a home with her parents but sometimes does not stay with them. Reports drinking about 2 drinks of alcohol about 2 times per week. Smokes half pack of cigarettes per day. Smokes marijuana daily. Uses cocaine "often". Denies other drug use. Denies safety or abuse concerns. A&Ox4, redness to areas on scalp and around  L eye. Breathing unlabored, abd soft nontender nondistended, skin warm dry.

## 2025-05-01 NOTE — ED PROVIDER NOTE - ATTENDING CONTRIBUTION TO CARE
30-year-old female with past medical history of bipolar disorder not on medications with recent facial cellulitis complicated by MSSA presents with left eye pain as well as cyst which she refers to as an abscess on her scalp that had drainage earlier than needed prior I&D.  Patient also reports migraine associate with her symptoms.     PE: well appearing, nontoxic, no respiratory distress.  EOMI, PERLLA, no obvious erythema or drainage of face. 1.5x1.5cm raised lesion on scalp with no fluctuance or drainage. Neuro nonfocal.  Skin intact. Psych normal mood.    MDM: Differential diagnosis includes but is not limited to

## 2025-05-01 NOTE — ED PROVIDER NOTE - PROGRESS NOTE DETAILS
diandra patient evaluated by Dr. Larose does not think pt clinically has any signs of cellulitis neither o scalp or periorbital region, pt has no purulent drainage noted on exam to scalp, and no erythema pain w eom or signs of preseptal cellulitis. given pt afebrile, no leukocytosis or signs of infection on cbc, will dc w doxy and have pt follow up.

## 2025-05-01 NOTE — ED ADULT NURSE NOTE - NSFALLUNIVINTERV_ED_ALL_ED
Bed/Stretcher in lowest position, wheels locked, appropriate side rails in place/Call bell, personal items and telephone in reach/Instruct patient to call for assistance before getting out of bed/chair/stretcher/Non-slip footwear applied when patient is off stretcher/[place] to call system/Physically safe environment - no spills, clutter or unnecessary equipment/Purposeful proactive rounding/Room/bathroom lighting operational, light cord in reach

## 2025-05-01 NOTE — ED PROVIDER NOTE - PHYSICAL EXAMINATION
GENERAL: well appearing in no acute distress, non-toxic appearing  HEAD: normocephalic, atraumatic  HEENT: normal conjunctiva, EOM intact, no pain w EOM, excoriations above eye no  erythema no proptosis or chemosis, visual field intact b/l  CARDIAC: regular rate and rhythm, normal S1S2, no appreciable murmurs, 2+ pulses in UE/LE b/l  PULM: normal breath sounds, clear to ascultation bilaterally, no rales, rhonchi, wheezing  GI: abdomen nondistended, soft, nontender, no guarding, rebound tenderness  SKIN: cystic lesions to scalp no drainage tender to palpation midline

## 2025-05-01 NOTE — ED PROVIDER NOTE - CLINICAL SUMMARY MEDICAL DECISION MAKING FREE TEXT BOX
30 y f multiple skin abscess due to MSSA 2/2 to skin picking presents to ed for L eye pain and redness and cyst on scalp which she says has had purulent drainage past few days on arrival normotensive afebrile and well appearing. pt yris not appear clinically to have preseptal cellulitis and no concern for post septal, eom intact no proptosis no vision change and only 3 excoriative lesions w no drainage around the eye, also has a cystic lesion on scalp w no drainage but exquisite TTP, will get ct and blood work given extensive history to eval depth of skin infection, r/o preseptal cellulitis.